# Patient Record
Sex: MALE | Race: BLACK OR AFRICAN AMERICAN | Employment: UNEMPLOYED | URBAN - METROPOLITAN AREA
[De-identification: names, ages, dates, MRNs, and addresses within clinical notes are randomized per-mention and may not be internally consistent; named-entity substitution may affect disease eponyms.]

---

## 2017-01-02 DIAGNOSIS — G40.909 SEIZURE DISORDER (H): Primary | ICD-10-CM

## 2017-01-02 RX ORDER — LEVETIRACETAM 500 MG/1
500 TABLET ORAL 2 TIMES DAILY
Qty: 60 TABLET | Refills: 5 | Status: SHIPPED | OUTPATIENT
Start: 2017-01-02 | End: 2017-07-26

## 2017-01-02 NOTE — TELEPHONE ENCOUNTER
Fax received 01/01/16    levETIRAcetam (KEPPRA) 500 MG      Last Written Prescription Date: 08/08/16  Last Fill Quantity: 60,  # refills: 5   Last Office Visit with [facility], [facility] or [facility] prescribing provider: 12/08/16

## 2017-02-17 ENCOUNTER — TELEPHONE (OUTPATIENT)
Dept: FAMILY MEDICINE | Facility: CLINIC | Age: 32
End: 2017-02-17

## 2017-02-17 ENCOUNTER — HOSPITAL ENCOUNTER (EMERGENCY)
Facility: CLINIC | Age: 32
Discharge: HOME | End: 2017-02-17
Admitting: EMERGENCY MEDICINE
Payer: COMMERCIAL

## 2017-02-17 VITALS
DIASTOLIC BLOOD PRESSURE: 92 MMHG | SYSTOLIC BLOOD PRESSURE: 126 MMHG | OXYGEN SATURATION: 99 % | TEMPERATURE: 98.9 F | RESPIRATION RATE: 18 BRPM | HEART RATE: 98 BPM

## 2017-02-17 LAB
FLUAV+FLUBV AG SPEC QL: NEGATIVE
FLUAV+FLUBV AG SPEC QL: NORMAL
FLUAV+FLUBV RNA SPEC QL NAA+PROBE: ABNORMAL
SPECIMEN SOURCE: ABNORMAL
SPECIMEN SOURCE: NORMAL

## 2017-02-17 PROCEDURE — 40000268 ZZH STATISTIC NO CHARGES

## 2017-02-17 PROCEDURE — 87804 INFLUENZA ASSAY W/OPTIC: CPT | Performed by: FAMILY MEDICINE

## 2017-02-17 NOTE — TELEPHONE ENCOUNTER
Incoming call from pt requesting a call back from RN. Per pt, he is not feeling well and would like to be seen. Writer offered to schedule an appt, but pt refused. He wants to be seen to be seen right away. Pt did not disclose further details to writer.    Melisa Fatima

## 2017-02-20 ENCOUNTER — OFFICE VISIT (OUTPATIENT)
Dept: FAMILY MEDICINE | Facility: CLINIC | Age: 32
End: 2017-02-20
Payer: COMMERCIAL

## 2017-02-20 VITALS
WEIGHT: 221 LBS | RESPIRATION RATE: 12 BRPM | OXYGEN SATURATION: 100 % | DIASTOLIC BLOOD PRESSURE: 90 MMHG | BODY MASS INDEX: 29.97 KG/M2 | TEMPERATURE: 98.4 F | SYSTOLIC BLOOD PRESSURE: 146 MMHG | HEART RATE: 111 BPM

## 2017-02-20 DIAGNOSIS — G40.909 SEIZURE DISORDER (H): Primary | ICD-10-CM

## 2017-02-20 DIAGNOSIS — B34.9 VIRAL SYNDROME: ICD-10-CM

## 2017-02-20 LAB
BASOPHILS # BLD AUTO: 0 10E9/L (ref 0–0.2)
BASOPHILS NFR BLD AUTO: 0.5 %
DEPRECATED S PYO AG THROAT QL EIA: NORMAL
DIFFERENTIAL METHOD BLD: ABNORMAL
EOSINOPHIL # BLD AUTO: 0.2 10E9/L (ref 0–0.7)
EOSINOPHIL NFR BLD AUTO: 4.3 %
ERYTHROCYTE [DISTWIDTH] IN BLOOD BY AUTOMATED COUNT: 13.2 % (ref 10–15)
HCT VFR BLD AUTO: 43.2 % (ref 40–53)
HETEROPH AB SER QL: NEGATIVE
HGB BLD-MCNC: 15.2 G/DL (ref 13.3–17.7)
LYMPHOCYTES # BLD AUTO: 2 10E9/L (ref 0.8–5.3)
LYMPHOCYTES NFR BLD AUTO: 47.1 %
MCH RBC QN AUTO: 31.4 PG (ref 26.5–33)
MCHC RBC AUTO-ENTMCNC: 35.2 G/DL (ref 31.5–36.5)
MCV RBC AUTO: 89 FL (ref 78–100)
MICRO REPORT STATUS: NORMAL
MONOCYTES # BLD AUTO: 0.7 10E9/L (ref 0–1.3)
MONOCYTES NFR BLD AUTO: 16.7 %
NEUTROPHILS # BLD AUTO: 1.3 10E9/L (ref 1.6–8.3)
NEUTROPHILS NFR BLD AUTO: 31.4 %
PLATELET # BLD AUTO: 281 10E9/L (ref 150–450)
RBC # BLD AUTO: 4.84 10E12/L (ref 4.4–5.9)
SPECIMEN SOURCE: NORMAL
WBC # BLD AUTO: 4.2 10E9/L (ref 4–11)

## 2017-02-20 PROCEDURE — 86308 HETEROPHILE ANTIBODY SCREEN: CPT | Performed by: FAMILY MEDICINE

## 2017-02-20 PROCEDURE — 87880 STREP A ASSAY W/OPTIC: CPT | Performed by: FAMILY MEDICINE

## 2017-02-20 PROCEDURE — 87798 DETECT AGENT NOS DNA AMP: CPT | Mod: 90 | Performed by: FAMILY MEDICINE

## 2017-02-20 PROCEDURE — 99000 SPECIMEN HANDLING OFFICE-LAB: CPT | Performed by: FAMILY MEDICINE

## 2017-02-20 PROCEDURE — 87081 CULTURE SCREEN ONLY: CPT | Performed by: FAMILY MEDICINE

## 2017-02-20 PROCEDURE — 99214 OFFICE O/P EST MOD 30 MIN: CPT | Performed by: FAMILY MEDICINE

## 2017-02-20 PROCEDURE — 85025 COMPLETE CBC W/AUTO DIFF WBC: CPT | Performed by: FAMILY MEDICINE

## 2017-02-20 PROCEDURE — 36415 COLL VENOUS BLD VENIPUNCTURE: CPT | Performed by: FAMILY MEDICINE

## 2017-02-20 NOTE — TELEPHONE ENCOUNTER
Pt was see in the ED Last Friday.      Will close encounter at this time and follow up as needed.     Nacho Snider RN

## 2017-02-20 NOTE — MR AVS SNAPSHOT
"              After Visit Summary   2017    Harry Gonzales    MRN: [id number]           Patient Information     Date Of Birth          1985        Visit Information        Provider Department      2017 1:30 PM Darling Camarillo MD [facility]        Today's Diagnoses     Seizure disorder (H)    -  1    Viral syndrome          Care Instructions    F/u with Rose Soriano in 2-4 weeks if not improving    Labs today            Follow-ups after your visit        Who to contact     If you have questions or need follow up information about today's clinic visit or your schedule please contact [facility] directly at [phone].  Normal or non-critical lab and imaging results will be communicated to you by MyChart, letter or phone within 4 business days after the clinic has received the results. If you do not hear from us within 7 days, please contact the clinic through MyChart or phone. If you have a critical or abnormal lab result, we will notify you by phone as soon as possible.  Submit refill requests through [employer] or call your pharmacy and they will forward the refill request to us. Please allow 3 business days for your refill to be completed.          Additional Information About Your Visit        MyChart Information     [employer] lets you send messages to your doctor, view your test results, renew your prescriptions, schedule appointments and more. To sign up, go to [email or web address] . Click on \"Log in\" on the left side of the screen, which will take you to the Welcome page. Then click on \"Sign up Now\" on the right side of the page.     You will be asked to enter the access code listed below, as well as some personal information. Please follow the directions to create your username and password.     Your access code is: [email or web address]  Expires: 2017  2:38 PM     Your access code will  in 90 days. If you need help or a new code, " please call your [facility] or [phone].        Care EveryWhere ID     This is your Care EveryWhere ID. This could be used by other organizations to access your [facility] medical records  [id number]        Your Vitals Were     Pulse Temperature Respirations Pulse Oximetry BMI (Body Mass Index)       111 98.4  F (36.9  C) (Oral) 12 100% 29.97 kg/m2        Blood Pressure from Last 3 Encounters:   02/20/17 146/90   02/17/17 (!) 126/92   12/08/16 125/90    Weight from Last 3 Encounters:   02/20/17 221 lb (100.2 kg)   12/08/16 216 lb (98 kg)   11/14/16 215 lb 8 oz (97.8 kg)              We Performed the Following     CBC with platelets and differential     Ehrlichia Anaplasma Sp by PCR     Mononucleosis screen     Strep, Rapid Screen     Zika Virus        Primary Care Provider Office Phone # Fax #    Rose Soriano APRMARYANN [phone]       [facility] [address] [place] [id number]        Thank you!     Thank you for choosing [facility]  for your care. Our goal is always to provide you with excellent care. Hearing back from our patients is one way we can continue to improve our services. Please take a few minutes to complete the written survey that you may receive in the mail after your visit with us. Thank you!             Your Updated Medication List - Protect others around you: Learn how to safely use, store and throw away your medicines at www.disposemymeds.org.          This list is accurate as of: 2/20/17  2:38 PM.  Always use your most recent med list.                   Brand Name Dispense Instructions for use    * hydrocortisone 1 % ointment     30 g    Apply sparingly to affected area two times daily for 7 days.       * hydrocortisone 1 % cream    CORTAID    30 g    Apply sparingly to affected area three times daily for 14 days.       ibuprofen 600 MG tablet    ADVIL/MOTRIN    20 tablet    Take 1 tablet (600 mg) by  mouth every 6 hours as needed for moderate pain       levETIRAcetam 500 MG tablet    KEPPRA    60 tablet    Take 1 tablet (500 mg) by mouth 2 times daily Needs to schedule with PCP prior to further refills.       loperamide 2 MG tablet    IMODIUM A-D    20 tablet    Take 1 tablet (2 mg) by mouth 4 times daily as needed for diarrhea       loratadine 10 MG tablet    CLARITIN    30 tablet    Take 1 tablet (10 mg) by mouth daily       OMEGA-3 FISH OIL PO      Take by mouth daily       ondansetron 4 MG tablet    ZOFRAN    18 tablet    Take 1 tablet (4 mg) by mouth every 8 hours as needed for nausea       psyllium 0.52 G capsule     100 capsule    Take 1 capsule (0.52 g) by mouth At Bedtime       terbinafine 250 MG tablet    lamISIL    14 tablet    Take 1 tablet (250 mg) by mouth daily       vitamin D 2000 UNITS tablet     100 tablet    Take 2,000 Units by mouth daily       * Notice:  This list has 2 medication(s) that are the same as other medications prescribed for you. Read the directions carefully, and ask your doctor or other care provider to review them with you.

## 2017-02-20 NOTE — NURSING NOTE
Chief Complaint   Patient presents with     RECHECK       Initial BP (!) 126/105 (BP Location: Left arm, Patient Position: Chair, Cuff Size: Adult Regular)  Pulse 111  Temp 98.4  F (36.9  C) (Oral)  Resp 12  Wt 221 lb (100.2 kg)  SpO2 100%  BMI 29.97 kg/m2 Estimated body mass index is 29.97 kg/(m^2) as calculated from the following:    Height as of 12/8/16: 6' (1.829 m).    Weight as of this encounter: 221 lb (100.2 kg).  Medication Reconciliation: complete     Gume Chan MA

## 2017-02-20 NOTE — PROGRESS NOTES
"SUBJECTIVE:                                                    Harry Gonzales is a 32 year old male who presents to clinic today for the following health issues:    Black male with glasses  Was in ER on the 17th but left. Influenza test was negative.    Chest congestion, headache lower neck, coughing, sneezing, feeling weak, body aches/pain, poor sleep, no energy, Had a fever last night.  Has been taking ibuprofen.    Here for \"cold\"  Onset Last Mon/Tues.  First symptom: HA, myalgia, coughing not much small yellow, upper neck hurting, fatigue, hard to focus and study  Fever? Felt feverish but no document temp at onset  Chills? yes  Nausea/Vomit/Diarrhea/Rash? Little nausea, little vomit none today. No diarrhea today. Rash no  What has he tried? Ibuprofen  Now lymph nodes bothering him  [employer] Feb 11 returned  Exposures: none known  Fluids ok   Solids ok. Today oatmeal, cheese stick  No known mosquito, tick or spider bites.      High Risk Drug Monitoring? Yes  Name of Drug being monitored: Keppra  Reason for drug, and what is being monitored and why: reduce frequency of seizures, BMP, neurological testing, BP  No seizure  BP Readings from Last 3 Encounters:   02/20/17 146/90   02/17/17 (!) 126/92   12/08/16 125/90     Estimated body mass index is 29.97 kg/(m^2) as calculated from the following:    Height as of 12/8/16: 6' (1.829 m).    Weight as of this encounter: 221 lb (100.2 kg).      Last Basic Metabolic Panel:  Lab Results   Component Value Date     11/08/2016      Lab Results   Component Value Date    POTASSIUM 3.8 11/08/2016     Lab Results   Component Value Date    CHLORIDE 107 11/08/2016     Lab Results   Component Value Date    AMBER 9.0 11/08/2016     Lab Results   Component Value Date    CO2 24 11/08/2016     Lab Results   Component Value Date    BUN 13 11/08/2016     Lab Results   Component Value Date    CR 0.76 11/08/2016     Lab Results   Component Value Date    GLC 81 11/08/2016     BP " Readings from Last 3 Encounters:   02/20/17 146/90   02/17/17 (!) 126/92   12/08/16 125/90     Keppra was low in Nov 2016  Follow-up Plan: Continue medication                Mental Health Followup    Status since last visit: Stable     See PHQ-9 for current symptoms.  Other associated symptoms: None    Complicating factors:   Significant life event:  No   Current substance abuse:  None  Anxiety or Panic symptoms:  No    MENTAL STATUS EXAM  Appearance: Appears stated age, dressed and groomed appropriately for the visit today.  Attitude: cooperative  Behavior: normal  Eye Contact: normal  Speech: normal  Orientation: oriented to person, place, time and situation  Mood:  admits sadness and anxiety most of time  Affect: Mood Congruent  Thought Process: clear  Suicidal Ideation: reports no thoughts, no intention  Hallucination: no  Paranoid-no  Manic-no  Panic-no  Self harm-cutting? no  OCD - no  Gambling - no  Excessive shopping no  Legal no  Self harm no    Sleep - limited due to illness  Appetite - decreased but reports drinking and eating  Exercise - limited due to illness and myalgia    Recent falls - no  Recent blackouts - no  Seizures - no    Smoking - no  Alcohol - no  Street drugs - no  Marijuana - no  Caffeine - occasional coffee/latte    Any ER/UC or hospital visits within the last 2 weeks? - see above left         PHQ-9  English PHQ-9   Any Language             Amount of exercise or physical activity: limited due to illness    Problems taking medications regularly: No    Medication side effects: none    Diet: regular (no restrictions)    Social History   Substance Use Topics     Smoking status: Never Smoker     Smokeless tobacco: Never Used     Alcohol use No        Problem list and histories reviewed & adjusted, as indicated.  Additional history: as documented    Patient Active Problem List   Diagnosis     Seizure disorder (H)     Health maintenance examination     CARDIOVASCULAR SCREENING; LDL GOAL LESS  THAN 160     Elevated blood pressure reading without diagnosis of hypertension     History of kidney stones     Seasonal allergic rhinitis due to fungal spores     No past surgical history on file.    Social History   Substance Use Topics     Smoking status: Never Smoker     Smokeless tobacco: Never Used     Alcohol use No     Family History   Problem Relation Age of Onset     Hypertension Father          Current Outpatient Prescriptions   Medication Sig Dispense Refill     levETIRAcetam (KEPPRA) 500 MG tablet Take 1 tablet (500 mg) by mouth 2 times daily Needs to schedule with PCP prior to further refills. 60 tablet 5     loratadine (CLARITIN) 10 MG tablet Take 1 tablet (10 mg) by mouth daily 30 tablet 11     psyllium 0.52 G capsule Take 1 capsule (0.52 g) by mouth At Bedtime 100 capsule 1     hydrocortisone (CORTAID) 1 % cream Apply sparingly to affected area three times daily for 14 days. 30 g 0     Omega-3 Fatty Acids (OMEGA-3 FISH OIL PO) Take by mouth daily       ondansetron (ZOFRAN) 4 MG tablet Take 1 tablet (4 mg) by mouth every 8 hours as needed for nausea 18 tablet 1     loperamide (IMODIUM A-D) 2 MG tablet Take 1 tablet (2 mg) by mouth 4 times daily as needed for diarrhea 20 tablet 0     hydrocortisone 1 % ointment Apply sparingly to affected area two times daily for 7 days. 30 g 0     ibuprofen (ADVIL,MOTRIN) 600 MG tablet Take 1 tablet (600 mg) by mouth every 6 hours as needed for moderate pain 20 tablet 0     terbinafine (LAMISIL) 250 MG tablet Take 1 tablet (250 mg) by mouth daily 14 tablet 0     Cholecalciferol (VITAMIN D) 2000 UNITS tablet Take 2,000 Units by mouth daily 100 tablet 3     Allergies   Allergen Reactions     Nkda [No Known Drug Allergies]      Seasonal Allergies      Recent Labs   Lab Test  11/08/16   1639  10/03/16   2210  01/22/15   1417  05/21/14   1650  11/04/13   1656   LDL  134*   --   145*   --    --    HDL   --    --   60   --    --    TRIG   --    --   112   --    --    ALT   35   --    --    --   33   CR  0.76  0.77   --    --   0.73   GFRESTIMATED  >90  Non  GFR Calc    >90  Non  GFR Calc     --    --   >90   GFRESTBLACK  >90   GFR Calc    >90   GFR Calc     --    --   >90   POTASSIUM  3.8  3.7   --    --   4.6   TSH   --    --    --   0.71   --       BP Readings from Last 3 Encounters:   02/20/17 146/90   02/17/17 (!) 126/92   12/08/16 125/90    Wt Readings from Last 3 Encounters:   02/20/17 221 lb (100.2 kg)   12/08/16 216 lb (98 kg)   11/14/16 215 lb 8 oz (97.8 kg)        Labs reviewed in EPIC  Problem list, Medication list, Allergies, and Medical/Social/Surgical histories reviewed in [facility] and updated as appropriate.     ROS: 10 point ROS neg other than the symptoms noted above in the HPI.       OBJECTIVE:                                                    /90  Pulse 111  Temp 98.4  F (36.9  C) (Oral)  Resp 12  Wt 221 lb (100.2 kg)  SpO2 100%  BMI 29.97 kg/m2  Body mass index is 29.97 kg/(m^2).  GENERAL: black male with glasses, healthy, alert, well nourished, well hydrated, no distress  EYES: Eyes grossly normal to inspection, extraocular movements - intact, and PERRL  HENT: ear canals- normal; TMs- normal; Nose- normal; Mouth- no ulcers, minimal phlegm and ?white tonsillar lesions  NECK: no tenderness, positive adenopathy, no asymmetry, no masses, no stiffness; thyroid- normal to palpation  RESP: lungs clear to auscultation - no rales, no rhonchi, no wheezes  CV: regular rates and rhythm, normal S1 S2, no S3 or S4 and no murmur, no click or rub - no homans or cords  ABDOMEN: soft, no tenderness, no  hepatosplenomegaly, no masses, normal bowel sounds  MS: extremities- no gross deformities noted, no edema  SKIN: no suspicious lesions, no rashes  NEURO: strength and tone- normal, sensory exam- grossly normal, mentation- intact, speech- normal, reflexes- symmetric  Non focal no aphasia. No facial asymmetry. Finger  to nose, rapid alteration, finger thumb opposition, heel knee shin are normal.  BACK: no CVA tenderness, no paralumbar tenderness  PSYCH: Alert and oriented times 3; speech- coherent , normal rate and volume; able to articulate logical thoughts, able to abstract reason, no tangential thoughts, no hallucinations or delusions, affect- normal   LYMPHATICS: ant. cervical- normal, post. cervical- normal, axillary- normal, supraclavicular- normal, inguinal- normal     Quick strep and mono were negative  Lab Results   Component Value Date    WBC 4.2 02/20/2017     Lab Results   Component Value Date    RBC 4.84 02/20/2017     Lab Results   Component Value Date    HGB 15.2 02/20/2017     Lab Results   Component Value Date    HCT 43.2 02/20/2017     Lab Results   Component Value Date    MCV 89 02/20/2017     Lab Results   Component Value Date    MCH 31.4 02/20/2017     Lab Results   Component Value Date    MCHC 35.2 02/20/2017     Lab Results   Component Value Date    RDW 13.2 02/20/2017     Lab Results   Component Value Date     02/20/2017       ASSESSMENT/PLAN:                                                      ASSESSMENT / PLAN:  (G40.909) Seizure disorder (H)  (primary encounter diagnosis)  Comment: reviewed that last Keppra level was low. No seizures reported  Plan: continue care    (B34.9) Viral syndrome  Comment: Was in [place], no known mosquito bite  Plan: Strep, Rapid Screen, Mononucleosis screen, CBC         with platelets and differential, Ehrlichia         Anaplasma Sp by PCR, Zika Virus                Patient Instructions:  F/u with Rose Thron in 2-4 weeks if not improving    Labs today        Darling Camarillo MD  [facility]  30 min spent in direct face to face time with this pt discussing Likely viral illness r/o strep/mono and others and others described above, greater than 50% in counseling and coordination of care.

## 2017-02-21 LAB
LOCATION PERFORMED: NORMAL
NORMAL RANGE FOR SEND OUTS MISC TEST: NORMAL
RESULT: NORMAL
SEND OUTS MISC TEST CODE: NORMAL
SEND OUTS MISC TEST SPECIMEN: NORMAL
TEST NAME: NORMAL

## 2017-02-22 LAB
BACTERIA SPEC CULT: NORMAL
MICRO REPORT STATUS: NORMAL
SPECIMEN SOURCE: NORMAL

## 2017-02-24 LAB
A PHAGOCYTOPH DNA BLD QL NAA+PROBE: NOT DETECTED
E CHAFFEENSIS DNA BLD QL NAA+PROBE: NOT DETECTED
E EWINGII DNA SPEC QL NAA+PROBE: NOT DETECTED
EHRLICHIA DNA SPEC QL NAA+PROBE: NORMAL

## 2017-06-09 ENCOUNTER — APPOINTMENT (OUTPATIENT)
Dept: GENERAL RADIOLOGY | Facility: CLINIC | Age: 32
End: 2017-06-09
Attending: EMERGENCY MEDICINE
Payer: COMMERCIAL

## 2017-06-09 ENCOUNTER — HOSPITAL ENCOUNTER (EMERGENCY)
Facility: CLINIC | Age: 32
Discharge: HOME | End: 2017-06-09
Attending: EMERGENCY MEDICINE | Admitting: EMERGENCY MEDICINE
Payer: COMMERCIAL

## 2017-06-09 VITALS
OXYGEN SATURATION: 97 % | RESPIRATION RATE: 16 BRPM | BODY MASS INDEX: 29.81 KG/M2 | WEIGHT: 219.8 LBS | HEART RATE: 69 BPM | TEMPERATURE: 98.3 F | DIASTOLIC BLOOD PRESSURE: 77 MMHG | SYSTOLIC BLOOD PRESSURE: 113 MMHG

## 2017-06-09 DIAGNOSIS — S39.012A BACK STRAIN, INITIAL ENCOUNTER: ICD-10-CM

## 2017-06-09 DIAGNOSIS — S20.221A BACK CONTUSION, RIGHT, INITIAL ENCOUNTER: ICD-10-CM
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
## 2017-06-09 PROCEDURE — 96372 THER/PROPH/DIAG INJ SC/IM: CPT

## 2017-06-09 PROCEDURE — 99284 EMERGENCY DEPT VISIT MOD MDM: CPT | Mod: 25

## 2017-06-09 PROCEDURE — 25000128 H RX IP 250 OP 636: Performed by: EMERGENCY MEDICINE

## 2017-06-09 PROCEDURE — 72100 X-RAY EXAM L-S SPINE 2/3 VWS: CPT

## 2017-06-09 PROCEDURE — 99284 EMERGENCY DEPT VISIT MOD MDM: CPT | Mod: Z6 | Performed by: EMERGENCY MEDICINE

## 2017-06-09 RX ORDER — CYCLOBENZAPRINE HCL 5 MG
10 TABLET ORAL 2 TIMES DAILY PRN
Qty: 10 TABLET | Refills: 0 | Status: SHIPPED | OUTPATIENT
Start: 2017-06-09 | End: 2017-07-03

## 2017-06-09 RX ORDER — KETOROLAC TROMETHAMINE 30 MG/ML
30 INJECTION, SOLUTION INTRAMUSCULAR; INTRAVENOUS ONCE
Status: COMPLETED | OUTPATIENT
Start: 2017-06-09 | End: 2017-06-09

## 2017-06-09 RX ORDER — NAPROXEN 500 MG/1
500 TABLET ORAL 2 TIMES DAILY WITH MEALS
Qty: 16 TABLET | Refills: 0 | Status: SHIPPED | OUTPATIENT
Start: 2017-06-09 | End: 2017-06-17

## 2017-06-09 RX ADMIN — KETOROLAC TROMETHAMINE 30 MG: 30 INJECTION, SOLUTION INTRAMUSCULAR at 15:24

## 2017-06-09 ASSESSMENT — ENCOUNTER SYMPTOMS
NECK PAIN: 0
POLYDIPSIA: 0
ABDOMINAL PAIN: 0
WEAKNESS: 0
VOMITING: 0
AGITATION: 0
BRUISES/BLEEDS EASILY: 0
FLANK PAIN: 0
CHILLS: 0
LIGHT-HEADEDNESS: 0
SHORTNESS OF BREATH: 0
DIFFICULTY URINATING: 0
NAUSEA: 0
FEVER: 0
NUMBNESS: 0
HEMATURIA: 0
COLOR CHANGE: 0
ADENOPATHY: 0
BACK PAIN: 1

## 2017-06-09 NOTE — ED AVS SNAPSHOT
[facility], Emergency Department    [address]    [facility] [place] [address]    Phone:  [phone]    Fax:  [phone]                                       Harry Gonzales   MRN: [id number]    Department:  [facility], Emergency Department   Date of Visit:  6/9/2017           Patient Information     Date Of Birth          1985        Your diagnoses for this visit were:     Back strain, initial encounter     Back contusion, right, initial encounter        You were seen by Crystal Garcia MD.        Discharge Instructions       Please make an appointment to follow up with [facility] (phone: [phone]) in 3 days if you have any concerns.    Back Sprain or Strain    Injury to the muscles (strain) or ligaments (sprain) around the spine can be troubling. Injury may occur after a sudden forceful twisting or bending force such as in a car accident, after a simple awkward movement, or after lifting something heavy with poor body positioning. In any case, muscle spasm is often present and adds to the pain.  Thankfully, most people feel better in 1 to 2 weeks, and most of the rest in 1 to 2 months. Most people can remain active. Unless you had a forceful physical injury such as a car accident or fall, X-rays are usually not ordered for the first evaluation of a back sprain or strain. If pain continues and does not respond to medical treatment, your healthcare provider may order X-rays and other tests.  Home care  The following guidelines will help you care for your injury at home:    When in bed, try to find a comfortable position. A firm mattress is best. Try lying flat on your back with pillows under your knees. You can also try lying on your side with your knees bent up toward your chest and a pillow between your knees.    Don't sit for long periods. Try not to take long car rides or take other trips that have you sitting for a long time. This puts more stress on the lower back than  standing or walking.    During the first 24 to 72 hours after an injury or flare-up, apply an ice pack to the painful area for 20 minutes. Then remove it for 20 minutes. Do this for 60 to 90 minutes, or several times a day, This will reduce swelling and pain. Be sure to wrap the ice pack in a thin towel or plastic to protect your skin.    You can start with ice, then switch to heat. Heat from a hot shower, hot bath, or heating pad reduces swelling and pain and works well for muscle spasms. Put heat on the painful area for 20 minutes, then remove for 20 minutes. Do this for 60 to 90 minutes, or several times a day. Do not use a heating pad while sleeping. It can burn the skin.    You can alternate the ice and heat. Talk with your healthcare provider to find out the best treatment or therapy for your back pain.    Therapeutic massage will help relax the back muscles without stretching them.    Be aware of safe lifting methods. Do not lift anything over 15 pounds until all of the pain is gone.  Medicines  Talk to your healthcare provider before using medicines, especially if you have other health problems or are taking other medicines.    You may use acetaminophen or ibuprofen to control pain, unless another pain medicine was prescribed. If you have chronic conditions like diabetes, liver or kidney disease, stomach ulcers, or gastrointestinal bleeding, or are taking blood-thinner medicines, talk with your doctor before taking any medicines.    Be careful if you are given prescription medicines, narcotics, or medicine for muscle spasm. They can cause drowsiness, and affect your coordination, reflexes, and judgment. Do not drive or operate heavy machinery.  Follow-up care  Follow up with your healthcare provider or this facility as advised. You may need physical therapy or more tests if your symptoms get worse.  If you had X-rays today, they didn t show any broken bones, breaks, or fractures. Sometimes fractures don t  show up on the first X-ray. Bruises and sprains can sometimes hurt as much as a fracture. These injuries can take time to heal completely. If your symptoms don t improve or they get worse, talk with your healthcare provider. You may need a repeat X-ray.  Call 911  Call for emergency care if any of the following occur:    Trouble breathing    Confused    Very drowsy or trouble awakening    Fainting or loss of consciousness    Rapid or very slow heart rate    Loss of bowel or bladder control  When to seek medical advice  Call your healthcare provider right away if any of the following occur:    Pain gets worse or spreads to your arms or legs    Weakness or numbness in one or both arms or legs    Numbness in the groin or genital area    7804-5211 [employer]. [address] [place] [address], [place] [address]. All rights reserved. This information is not intended as a substitute for professional medical care. Always follow your healthcare professional's instructions.          Back Contusion    You have a contusion to your back. A contusion is also called a bruise. There is swelling and some bleeding under the skin. The skin may be purplish. You may have muscle aching and stiffness in the area of the bruise. There are no broken bones.  Contusions heal on their own, without further treatment. However, pain and skin discoloration may take weeks to months to go away.   Home care    Rest. Avoid heavy lifting, strenuous exertion, or any activity that causes pain.    Ice the area to reduce pain and swelling. Put ice cubes in a plastic bag or use a cold pack. (Wrap the cold source in a thin towel. Do not place it directly on your skin.) Ice the injured area for 20 minutes every 1-2 hours the first day. Continue with ice packs 3-4 times a day for the next two days, then as needed for the relief of pain and swelling.    Take any prescribed pain medication. If none was prescribed, take acetaminophen, ibuprofen, or  naproxen to control pain.  Follow-up care  Follow up with your healthcare provider, or as directed. Call if you are not better in 1-2 weeks.  When to seek medical advice  Call your healthcare provider for any of the following:    New or worsening pain    Increased swelling around the bruise    Pain spreads to one or both legs    Weakness or numbness in one or both legs     Loss of bowel or bladder control    Numbness in the groin or genital area    Fever of 100.4 F (38 C) or higher, or as directed by your healthcare provider    7947-2017 [employer]. [address] [place] [address]. All rights reserved. This information is not intended as a substitute for professional medical care. Always follow your healthcare professional's instructions.            24 Hour Appointment Hotline       To make an appointment at any [facility], call [phone] ([phone]). If you don't have a family doctor or clinic, we will help you find one. [facility] are conveniently located to serve the needs of you and your family.             Review of your medicines      START taking        Dose / Directions Last dose taken    cyclobenzaprine 5 MG tablet   Commonly known as:  FLEXERIL   Dose:  10 mg   Quantity:  10 tablet        Take 2 tablets (10 mg) by mouth 2 times daily as needed for muscle spasms   Refills:  0        naproxen 500 MG tablet   Commonly known as:  NAPROSYN   Dose:  500 mg   Quantity:  16 tablet        Take 1 tablet (500 mg) by mouth 2 times daily (with meals) for 8 days   Refills:  0          Our records show that you are taking the medicines listed below. If these are incorrect, please call your family doctor or clinic.        Dose / Directions Last dose taken    * hydrocortisone 1 % ointment   Quantity:  30 g        Apply sparingly to affected area two times daily for 7 days.   Refills:  0        * hydrocortisone 1 % cream   Commonly known as:  CORTAID   Quantity:  30 g         Apply sparingly to affected area three times daily for 14 days.   Refills:  0        ibuprofen 600 MG tablet   Commonly known as:  ADVIL/MOTRIN   Dose:  600 mg   Quantity:  20 tablet        Take 1 tablet (600 mg) by mouth every 6 hours as needed for moderate pain   Refills:  0        levETIRAcetam 500 MG tablet   Commonly known as:  KEPPRA   Dose:  500 mg   Quantity:  60 tablet        Take 1 tablet (500 mg) by mouth 2 times daily Needs to schedule with PCP prior to further refills.   Refills:  5        loperamide 2 MG tablet   Commonly known as:  IMODIUM A-D   Dose:  2 mg   Quantity:  20 tablet        Take 1 tablet (2 mg) by mouth 4 times daily as needed for diarrhea   Refills:  0        loratadine 10 MG tablet   Commonly known as:  CLARITIN   Dose:  10 mg   Quantity:  30 tablet        Take 1 tablet (10 mg) by mouth daily   Refills:  11        OMEGA-3 FISH OIL PO        Take by mouth daily   Refills:  0        ondansetron 4 MG tablet   Commonly known as:  ZOFRAN   Dose:  4 mg   Quantity:  18 tablet        Take 1 tablet (4 mg) by mouth every 8 hours as needed for nausea   Refills:  1        psyllium 0.52 G capsule   Dose:  1 capsule   Quantity:  100 capsule        Take 1 capsule (0.52 g) by mouth At Bedtime   Refills:  1        terbinafine 250 MG tablet   Commonly known as:  lamISIL   Dose:  250 mg   Quantity:  14 tablet        Take 1 tablet (250 mg) by mouth daily   Refills:  0        vitamin D 2000 UNITS tablet   Dose:  2000 Units   Quantity:  100 tablet        Take 2,000 Units by mouth daily   Refills:  3        * Notice:  This list has 2 medication(s) that are the same as other medications prescribed for you. Read the directions carefully, and ask your doctor or other care provider to review them with you.            Prescriptions were sent or printed at these locations (2 Prescriptions)                   Other Prescriptions                Printed at Department/Unit printer (2 of 2)         naproxen (NAPROSYN)  "500 MG tablet               cyclobenzaprine (FLEXERIL) 5 MG tablet                Procedures and tests performed during your visit     Lumbar spine XR, 2-3 views      Orders Needing Specimen Collection     None      Pending Results     No orders found from 2017 to 6/10/2017.            Pending Culture Results     No orders found from 2017 to 6/10/2017.            Pending Results Instructions     If you had any lab results that were not finalized at the time of your Discharge, you can call the ED Lab Result RN at [phone]. You will be contacted by this team for any positive Lab results or changes in treatment. The nurses are available 7 days a week from 10A to 6:30P.  You can leave a message 24 hours per day and they will return your call.        Thank you for choosing [facility]       Thank you for choosing [facility] for your care. Our goal is always to provide you with excellent care. Hearing back from our patients is one way we can continue to improve our services. Please take a few minutes to complete the written survey that you may receive in the mail after you visit with us. Thank you!        [employer] Information     [employer] lets you send messages to your doctor, view your test results, renew your prescriptions, schedule appointments and more. To sign up, go to [email or web address] . Click on \"Log in\" on the left side of the screen, which will take you to the Welcome page. Then click on \"Sign up Now\" on the right side of the page.     You will be asked to enter the access code listed below, as well as some personal information. Please follow the directions to create your username and password.     Your access code is: [email or web address]  Expires: 2017  3:38 PM     Your access code will  in 90 days. If you need help or a new code, please call your [facility] clinic or [phone].        Care EveryWhere ID     This is your Care EveryWhere ID. This could be used by other organizations to access your " [facility] medical records  [id number]        After Visit Summary       This is your record. Keep this with you and show to your community pharmacist(s) and doctor(s) at your next visit.

## 2017-06-09 NOTE — ED AVS SNAPSHOT
[facility] [place], Emergency Department    [address]    [facility] [place] [address]    Phone:  [phone]    Fax:  [phone]                                       Harry Gonzales   MRN: [id number]    Department:  [facility], Emergency Department   Date of Visit:  6/9/2017           After Visit Summary Signature Page     I have received my discharge instructions, and my questions have been answered. I have discussed any challenges I see with this plan with the nurse or doctor.    ..........................................................................................................................................  Patient/Patient Representative Signature      ..........................................................................................................................................  Patient Representative Print Name and Relationship to Patient    ..................................................               ................................................  Date                                            Time    ..........................................................................................................................................  Reviewed by Signature/Title    ...................................................              ..............................................  Date                                                            Time

## 2017-06-09 NOTE — DISCHARGE INSTRUCTIONS
Please make an appointment to follow up with [facility] (phone: [phone]) in 3 days if you have any concerns.    Back Sprain or Strain    Injury to the muscles (strain) or ligaments (sprain) around the spine can be troubling. Injury may occur after a sudden forceful twisting or bending force such as in a car accident, after a simple awkward movement, or after lifting something heavy with poor body positioning. In any case, muscle spasm is often present and adds to the pain.  Thankfully, most people feel better in 1 to 2 weeks, and most of the rest in 1 to 2 months. Most people can remain active. Unless you had a forceful physical injury such as a car accident or fall, X-rays are usually not ordered for the first evaluation of a back sprain or strain. If pain continues and does not respond to medical treatment, your healthcare provider may order X-rays and other tests.  Home care  The following guidelines will help you care for your injury at home:    When in bed, try to find a comfortable position. A firm mattress is best. Try lying flat on your back with pillows under your knees. You can also try lying on your side with your knees bent up toward your chest and a pillow between your knees.    Don't sit for long periods. Try not to take long car rides or take other trips that have you sitting for a long time. This puts more stress on the lower back than standing or walking.    During the first 24 to 72 hours after an injury or flare-up, apply an ice pack to the painful area for 20 minutes. Then remove it for 20 minutes. Do this for 60 to 90 minutes, or several times a day, This will reduce swelling and pain. Be sure to wrap the ice pack in a thin towel or plastic to protect your skin.    You can start with ice, then switch to heat. Heat from a hot shower, hot bath, or heating pad reduces swelling and pain and works well for muscle spasms. Put heat on the painful area for 20 minutes, then  remove for 20 minutes. Do this for 60 to 90 minutes, or several times a day. Do not use a heating pad while sleeping. It can burn the skin.    You can alternate the ice and heat. Talk with your healthcare provider to find out the best treatment or therapy for your back pain.    Therapeutic massage will help relax the back muscles without stretching them.    Be aware of safe lifting methods. Do not lift anything over 15 pounds until all of the pain is gone.  Medicines  Talk to your healthcare provider before using medicines, especially if you have other health problems or are taking other medicines.    You may use acetaminophen or ibuprofen to control pain, unless another pain medicine was prescribed. If you have chronic conditions like diabetes, liver or kidney disease, stomach ulcers, or gastrointestinal bleeding, or are taking blood-thinner medicines, talk with your doctor before taking any medicines.    Be careful if you are given prescription medicines, narcotics, or medicine for muscle spasm. They can cause drowsiness, and affect your coordination, reflexes, and judgment. Do not drive or operate heavy machinery.  Follow-up care  Follow up with your healthcare provider or this facility as advised. You may need physical therapy or more tests if your symptoms get worse.  If you had X-rays today, they didn t show any broken bones, breaks, or fractures. Sometimes fractures don t show up on the first X-ray. Bruises and sprains can sometimes hurt as much as a fracture. These injuries can take time to heal completely. If your symptoms don t improve or they get worse, talk with your healthcare provider. You may need a repeat X-ray.  Call 911  Call for emergency care if any of the following occur:    Trouble breathing    Confused    Very drowsy or trouble awakening    Fainting or loss of consciousness    Rapid or very slow heart rate    Loss of bowel or bladder control  When to seek medical advice  Call your healthcare  provider right away if any of the following occur:    Pain gets worse or spreads to your arms or legs    Weakness or numbness in one or both arms or legs    Numbness in the groin or genital area    9051-1600 [employer]. [address] [place] [address], [place] [address]. All rights reserved. This information is not intended as a substitute for professional medical care. Always follow your healthcare professional's instructions.          Back Contusion    You have a contusion to your back. A contusion is also called a bruise. There is swelling and some bleeding under the skin. The skin may be purplish. You may have muscle aching and stiffness in the area of the bruise. There are no broken bones.  Contusions heal on their own, without further treatment. However, pain and skin discoloration may take weeks to months to go away.   Home care    Rest. Avoid heavy lifting, strenuous exertion, or any activity that causes pain.    Ice the area to reduce pain and swelling. Put ice cubes in a plastic bag or use a cold pack. (Wrap the cold source in a thin towel. Do not place it directly on your skin.) Ice the injured area for 20 minutes every 1-2 hours the first day. Continue with ice packs 3-4 times a day for the next two days, then as needed for the relief of pain and swelling.    Take any prescribed pain medication. If none was prescribed, take acetaminophen, ibuprofen, or naproxen to control pain.  Follow-up care  Follow up with your healthcare provider, or as directed. Call if you are not better in 1-2 weeks.  When to seek medical advice  Call your healthcare provider for any of the following:    New or worsening pain    Increased swelling around the bruise    Pain spreads to one or both legs    Weakness or numbness in one or both legs     Loss of bowel or bladder control    Numbness in the groin or genital area    Fever of 100.4 F (38 C) or higher, or as directed by your healthcare provider    7295-6788 [employer]. [address] [place] [address], [place] [address]. All rights reserved. This information is not intended as a substitute for professional medical care. Always follow your healthcare professional's instructions.

## 2017-06-09 NOTE — ED PROVIDER NOTES
History     Chief Complaint   Patient presents with     Back Pain     pt slipped on water and fell backwards around 1230, having left sided lower back pain     HPI  Harry Gonzales is a 32 year old male with a history of HTN and seizures who presents to the Emergency Department for evaluation after a fall. About 2 hours ago, the patient slipped in the bathroom, falling backwards onto his back and hitting the back of his head on the floor. He did not lose consciousness. Immediately, he developed right, lower, sharp, non-radiating, constant, mild-moderate back pain. He was able to ambulate after the fall without difficulty.  Movement exacerbates his pain and has not taken anything for his discomfort secondary to fasting. Rest improves the symptoms. He denies headache, nausea, vomiting, weakness, numbness, tingling, urinary symptoms, loss of bowel or bladder control, abdominal pain, fever, or any other concerns or complaints at this time.    Past Medical History:   Diagnosis Date     Allergic state      Seizures (H)        History reviewed. No pertinent surgical history.    Family History   Problem Relation Age of Onset     Hypertension Father        Social History   Substance Use Topics     Smoking status: Never Smoker     Smokeless tobacco: Never Used     Alcohol use No       No current facility-administered medications for this encounter.      Current Outpatient Prescriptions   Medication     naproxen (NAPROSYN) 500 MG tablet     cyclobenzaprine (FLEXERIL) 5 MG tablet     levETIRAcetam (KEPPRA) 500 MG tablet     ibuprofen (ADVIL,MOTRIN) 600 MG tablet     Cholecalciferol (VITAMIN D) 2000 UNITS tablet     loratadine (CLARITIN) 10 MG tablet     psyllium 0.52 G capsule     hydrocortisone (CORTAID) 1 % cream     Omega-3 Fatty Acids (OMEGA-3 FISH OIL PO)     ondansetron (ZOFRAN) 4 MG tablet     loperamide (IMODIUM A-D) 2 MG tablet     hydrocortisone 1 % ointment     terbinafine (LAMISIL) 250 MG tablet        Allergies    Allergen Reactions     Nkda [No Known Drug Allergies]      Seasonal Allergies      I have reviewed the Medications, Allergies, Past Medical and Surgical History, and Social History in the Epic system.    Review of Systems   Constitutional: Negative for chills and fever.   HENT: Negative for congestion.    Eyes: Negative for visual disturbance.   Respiratory: Negative for shortness of breath.    Cardiovascular: Negative for chest pain.   Gastrointestinal: Negative for abdominal pain, nausea and vomiting.   Endocrine: Negative for polydipsia and polyuria.   Genitourinary: Negative for difficulty urinating, flank pain and hematuria.   Musculoskeletal: Positive for back pain. Negative for gait problem and neck pain.   Skin: Negative for color change.   Neurological: Negative for weakness, light-headedness and numbness.   Hematological: Negative for adenopathy. Does not bruise/bleed easily.   Psychiatric/Behavioral: Negative for agitation and behavioral problems.       Physical Exam   BP: 116/78  Pulse: 84  Temp: 97.7  F (36.5  C)  Resp: 16  Weight: 99.7 kg (219 lb 12.8 oz)  SpO2: 97 %  Physical Exam   Constitutional: He is oriented to person, place, and time. He appears well-developed and well-nourished. No distress.   HENT:   Head: Normocephalic and atraumatic.   Right Ear: External ear normal.   Left Ear: External ear normal.   Nose: Nose normal.   Mouth/Throat: Oropharynx is clear and moist. No oropharyngeal exudate.   Eyes: Conjunctivae and EOM are normal. Pupils are equal, round, and reactive to light. No scleral icterus.   Neck: Normal range of motion, full passive range of motion without pain and phonation normal. Neck supple. No spinous process tenderness and no muscular tenderness present. No rigidity. No edema, no erythema and normal range of motion present.   Cardiovascular: Normal rate, normal heart sounds and intact distal pulses.    Pulmonary/Chest: Effort normal and breath sounds normal. No respiratory  distress. He has no wheezes. He has no rales.   Abdominal: Soft. Bowel sounds are normal. There is no tenderness.   Musculoskeletal: Normal range of motion. He exhibits tenderness ( in right lower back musculature). He exhibits no edema.   There is no midline cervical, thoracic, or lumbar spinous process tenderness to palpation.  Pelvis is stable. No tenderness over hips.   Neurological: He is alert and oriented to person, place, and time. He has normal strength and normal reflexes. He displays normal reflexes. No cranial nerve deficit or sensory deficit. He exhibits normal muscle tone. Coordination and gait normal. GCS eye subscore is 4. GCS verbal subscore is 5. GCS motor subscore is 6.   Reflex Scores:       Patellar reflexes are 2+ on the right side and 2+ on the left side.       Achilles reflexes are 2+ on the right side and 2+ on the left side.  Strength 5/5 in b/l UEs with  and b/l LEs with dorsi- and plantar-flexion against resistance. Sensation to light touch and 2-point discrimination intact in b/l distal UEs and LEs. No saddle anesthesia. Normal gait. 2+ pattellar and Achilles reflexes b/l.     Skin: Skin is warm. No rash noted. He is not diaphoretic.   Psychiatric: He has a normal mood and affect. His behavior is normal. Judgment and thought content normal.   Nursing note and vitals reviewed.      ED Course     ED Course     Procedures             Critical Care time:  none               Labs Ordered and Resulted from Time of ED Arrival Up to the Time of Departure from the ED - No data to display         Assessments & Plan (with Medical Decision Making)   32-year-old man presenting with the right-sided back pain after fall that occurred last night. Differential diagnosis: Fracture, contusion, strain, unlikely cauda equina syndrome.    After thorough history and physical exam patient presents in no acute distress. I will treatr his pain with IM Toradol and obtain and x-ray for further diagnostic  evaluation. He agrees with plan.    I reviewed the patient s x-ray and I read the radiology report; there is no evidence of acute fracture. He has no bony tenderness on the examination whatsoever and has a normal neurologic deficits. I have extremely low suspicion for cauda equina syndrome. At this point patient stable for discharge with prescriptions for Naproxen and Flexeril. He agrees to follow-up with his primary care clinic if he has any further concerns and return to Emergency Department if his symptoms worsen. Stable for discharge. Gait is normal at discharge.    I have reviewed the nursing notes.  I have reviewed the findings, diagnosis, plan and need for follow up with the patient.  New Prescriptions    CYCLOBENZAPRINE (FLEXERIL) 5 MG TABLET    Take 2 tablets (10 mg) by mouth 2 times daily as needed for muscle spasms    NAPROXEN (NAPROSYN) 500 MG TABLET    Take 1 tablet (500 mg) by mouth 2 times daily (with meals) for 8 days       Final diagnoses:   Back strain, initial encounter   Back contusion, right, initial encounter     IMelissa, am serving as a trained medical scribe to document services personally performed by Crystal Garcia MD, based on the provider's statements to me.      ICrystal MD, was physically present and have reviewed and verified the accuracy of this note documented by Melissa Hills.     6/9/2017   [facility], [place], EMERGENCY DEPARTMENT     Crystal Garcia MD  06/09/17 1573

## 2017-07-03 ENCOUNTER — TELEPHONE (OUTPATIENT)
Dept: FAMILY MEDICINE | Facility: CLINIC | Age: 32
End: 2017-07-03

## 2017-07-03 DIAGNOSIS — M54.50 ACUTE BILATERAL LOW BACK PAIN WITHOUT SCIATICA: Primary | ICD-10-CM

## 2017-07-03 RX ORDER — CYCLOBENZAPRINE HCL 5 MG
10 TABLET ORAL 2 TIMES DAILY PRN
Qty: 40 TABLET | Refills: 3 | Status: SHIPPED | OUTPATIENT
Start: 2017-07-03 | End: 2017-07-16

## 2017-07-03 NOTE — TELEPHONE ENCOUNTER
Patient calling about back pain that patient has had since fall prior to ED visit, patient is requesting refill of flexeril and appt with Rose. Patient scheduled with Rose Wednesday    Flexeril      Last Written Prescription Date: 6/9/17  Last Fill Quantity: 10,  # refills: 0   Last Office Visit with [facility], P or [facility] prescribing provider: 7/5/17                                         Next 5 appointments (look out 90 days)     Jul 05, 2017  9:30 AM CDT   Return Visit with VICTORIANO Edwards CNP   [facility] ([facility])    [address] [place] [address]  [address]  [facility] [address]   [phone]

## 2017-07-03 NOTE — TELEPHONE ENCOUNTER
Reason for Call:  Other call back    Detailed comments: Pt states he fell, and is not feeling well.     Phone Number Patient can be reached at: [phone]    Best Time: Anytime    Can we leave a detailed message on this number? YES    Call taken on 7/3/2017 at 1:03 PM by Melisa Fatima

## 2017-07-16 ENCOUNTER — HOSPITAL ENCOUNTER (EMERGENCY)
Facility: CLINIC | Age: 32
Discharge: HOME | End: 2017-07-16
Attending: FAMILY MEDICINE | Admitting: FAMILY MEDICINE
Payer: COMMERCIAL

## 2017-07-16 ENCOUNTER — APPOINTMENT (OUTPATIENT)
Dept: CT IMAGING | Facility: CLINIC | Age: 32
End: 2017-07-16
Attending: FAMILY MEDICINE
Payer: COMMERCIAL

## 2017-07-16 VITALS
WEIGHT: 217 LBS | DIASTOLIC BLOOD PRESSURE: 94 MMHG | SYSTOLIC BLOOD PRESSURE: 130 MMHG | OXYGEN SATURATION: 98 % | TEMPERATURE: 98.2 F | BODY MASS INDEX: 28.76 KG/M2 | HEART RATE: 75 BPM | HEIGHT: 73 IN | RESPIRATION RATE: 16 BRPM

## 2017-07-16 DIAGNOSIS — M54.50 LOW BACK PAIN WITHOUT SCIATICA, UNSPECIFIED BACK PAIN LATERALITY, UNSPECIFIED CHRONICITY: ICD-10-CM

## 2017-07-16 DIAGNOSIS — N41.9 PROSTATITIS, UNSPECIFIED PROSTATITIS TYPE: ICD-10-CM

## 2017-07-16 DIAGNOSIS — M54.50 BILATERAL LOW BACK PAIN WITHOUT SCIATICA, UNSPECIFIED CHRONICITY: ICD-10-CM

## 2017-07-16 LAB
ALBUMIN UR-MCNC: NEGATIVE MG/DL
ANION GAP SERPL CALCULATED.3IONS-SCNC: 13 MMOL/L (ref 3–14)
APPEARANCE UR: CLEAR
BASOPHILS # BLD AUTO: 0 10E9/L (ref 0–0.2)
BASOPHILS NFR BLD AUTO: 0.2 %
BILIRUB UR QL STRIP: NEGATIVE
BUN SERPL-MCNC: 11 MG/DL (ref 7–30)
CALCIUM SERPL-MCNC: 9 MG/DL (ref 8.5–10.1)
CHLORIDE SERPL-SCNC: 107 MMOL/L (ref 94–109)
CO2 SERPL-SCNC: 21 MMOL/L (ref 20–32)
COLOR UR AUTO: YELLOW
CREAT SERPL-MCNC: 0.74 MG/DL (ref 0.66–1.25)
DIFFERENTIAL METHOD BLD: NORMAL
EOSINOPHIL # BLD AUTO: 0.1 10E9/L (ref 0–0.7)
EOSINOPHIL NFR BLD AUTO: 2.5 %
ERYTHROCYTE [DISTWIDTH] IN BLOOD BY AUTOMATED COUNT: 13 % (ref 10–15)
GFR SERPL CREATININE-BSD FRML MDRD: NORMAL ML/MIN/1.7M2
GLUCOSE SERPL-MCNC: 78 MG/DL (ref 70–99)
GLUCOSE UR STRIP-MCNC: NEGATIVE MG/DL
HCT VFR BLD AUTO: 42.4 % (ref 40–53)
HGB BLD-MCNC: 14.9 G/DL (ref 13.3–17.7)
HGB UR QL STRIP: ABNORMAL
IMM GRANULOCYTES # BLD: 0 10E9/L (ref 0–0.4)
IMM GRANULOCYTES NFR BLD: 0.2 %
KETONES UR STRIP-MCNC: NEGATIVE MG/DL
LEUKOCYTE ESTERASE UR QL STRIP: NEGATIVE
LYMPHOCYTES # BLD AUTO: 2.2 10E9/L (ref 0.8–5.3)
LYMPHOCYTES NFR BLD AUTO: 39.1 %
MCH RBC QN AUTO: 31.4 PG (ref 26.5–33)
MCHC RBC AUTO-ENTMCNC: 35.1 G/DL (ref 31.5–36.5)
MCV RBC AUTO: 90 FL (ref 78–100)
MONOCYTES # BLD AUTO: 0.7 10E9/L (ref 0–1.3)
MONOCYTES NFR BLD AUTO: 13.2 %
MUCOUS THREADS #/AREA URNS LPF: PRESENT /LPF
NEUTROPHILS # BLD AUTO: 2.5 10E9/L (ref 1.6–8.3)
NEUTROPHILS NFR BLD AUTO: 44.8 %
NITRATE UR QL: NEGATIVE
NRBC # BLD AUTO: 0 10*3/UL
NRBC BLD AUTO-RTO: 0 /100
PH UR STRIP: 5.5 PH (ref 5–7)
PLATELET # BLD AUTO: 260 10E9/L (ref 150–450)
POTASSIUM SERPL-SCNC: 3.6 MMOL/L (ref 3.4–5.3)
RBC # BLD AUTO: 4.74 10E12/L (ref 4.4–5.9)
RBC #/AREA URNS AUTO: 2 /HPF (ref 0–2)
SODIUM SERPL-SCNC: 141 MMOL/L (ref 133–144)
SP GR UR STRIP: 1.02 (ref 1–1.03)
URN SPEC COLLECT METH UR: ABNORMAL
UROBILINOGEN UR STRIP-MCNC: NORMAL MG/DL (ref 0–2)
WBC # BLD AUTO: 5.5 10E9/L (ref 4–11)
WBC #/AREA URNS AUTO: 1 /HPF (ref 0–2)

## 2017-07-16 PROCEDURE — 72131 CT LUMBAR SPINE W/O DYE: CPT

## 2017-07-16 PROCEDURE — 87591 N.GONORRHOEAE DNA AMP PROB: CPT | Performed by: FAMILY MEDICINE

## 2017-07-16 PROCEDURE — 87491 CHLMYD TRACH DNA AMP PROBE: CPT | Performed by: FAMILY MEDICINE

## 2017-07-16 PROCEDURE — 80048 BASIC METABOLIC PNL TOTAL CA: CPT | Performed by: FAMILY MEDICINE

## 2017-07-16 PROCEDURE — 85025 COMPLETE CBC W/AUTO DIFF WBC: CPT | Performed by: FAMILY MEDICINE

## 2017-07-16 PROCEDURE — 99284 EMERGENCY DEPT VISIT MOD MDM: CPT | Mod: Z6 | Performed by: FAMILY MEDICINE

## 2017-07-16 PROCEDURE — 81001 URINALYSIS AUTO W/SCOPE: CPT | Performed by: FAMILY MEDICINE

## 2017-07-16 PROCEDURE — 99284 EMERGENCY DEPT VISIT MOD MDM: CPT | Mod: 25 | Performed by: FAMILY MEDICINE

## 2017-07-16 RX ORDER — TAMSULOSIN HYDROCHLORIDE 0.4 MG/1
0.4 CAPSULE ORAL DAILY
Qty: 10 CAPSULE | Refills: 0 | Status: SHIPPED | OUTPATIENT
Start: 2017-07-16 | End: 2017-07-26

## 2017-07-16 RX ORDER — IBUPROFEN 800 MG/1
800 TABLET, FILM COATED ORAL EVERY 8 HOURS PRN
Qty: 20 TABLET | Refills: 0 | Status: SHIPPED | OUTPATIENT
Start: 2017-07-16 | End: 2017-07-24

## 2017-07-16 RX ORDER — DOXYCYCLINE 100 MG/1
100 CAPSULE ORAL 2 TIMES DAILY
Qty: 20 CAPSULE | Refills: 0 | Status: SHIPPED | OUTPATIENT
Start: 2017-07-16 | End: 2017-07-26

## 2017-07-16 NOTE — ED AVS SNAPSHOT
[facility], Emergency Department    [address]    [place] [address]    Phone:  [phone]    Fax:  [phone]                                       Harry Gonzales   MRN: [id number]    Department:  [facility], Emergency Department   Date of Visit:  7/16/2017           Patient Information     Date Of Birth          1985        Your diagnoses for this visit were:     Prostatitis, unspecified prostatitis type     Bilateral low back pain without sciatica, unspecified chronicity        You were seen by Tres Roche MD.        Discharge Instructions       Thank you for choosing [facility].     Please closely monitor for further symptoms. Return to the Emergency Department if you develop any new or worsening signs or symptoms.    If you received any opiate pain medications or sedatives during your visit, please do not drive for at least 8 hours.     Labs, cultures or final xray interpretations may still need to be reviewed.  We will call you if your plan of care needs to be changed.    Please make an appointment to follow up with Your Primary Care Provider regarding her back difficulty, and Urology Clinic (phone: [phone]) in next 7-10 days if your urinary symptoms do not improve.        Discharge References/Attachments     PROSTATITIS (ENGLISH)      24 Hour Appointment Hotline       To make an appointment at any [facility] clinic, call [phone] ([phone]). If you don't have a family doctor or clinic, we will help you find one. [facility] clinics are conveniently located to serve the needs of you and your family.             Review of your medicines      START taking        Dose / Directions Last dose taken    doxycycline 100 MG capsule   Commonly known as:  VIBRAMYCIN   Dose:  100 mg   Quantity:  20 capsule        Take 1 capsule (100 mg) by mouth 2 times daily for 10 days   Refills:  0        tamsulosin 0.4 MG capsule   Commonly known as:  FLOMAX   Dose:   0.4 mg   Quantity:  10 capsule        Take 1 capsule (0.4 mg) by mouth daily for 10 doses   Refills:  0          CONTINUE these medicines which may have CHANGED, or have new prescriptions. If we are uncertain of the size of tablets/capsules you have at home, strength may be listed as something that might have changed.        Dose / Directions Last dose taken    ibuprofen 800 MG tablet   Commonly known as:  ADVIL/MOTRIN   Dose:  800 mg   What changed:    - medication strength  - how much to take  - when to take this   Quantity:  20 tablet        Take 1 tablet (800 mg) by mouth every 8 hours as needed for moderate pain   Refills:  0          Our records show that you are taking the medicines listed below. If these are incorrect, please call your family doctor or clinic.        Dose / Directions Last dose taken    * hydrocortisone 1 % ointment   Quantity:  30 g        Apply sparingly to affected area two times daily for 7 days.   Refills:  0        * hydrocortisone 1 % cream   Commonly known as:  CORTAID   Quantity:  30 g        Apply sparingly to affected area three times daily for 14 days.   Refills:  0        levETIRAcetam 500 MG tablet   Commonly known as:  KEPPRA   Dose:  500 mg   Quantity:  60 tablet        Take 1 tablet (500 mg) by mouth 2 times daily Needs to schedule with PCP prior to further refills.   Refills:  5        loperamide 2 MG tablet   Commonly known as:  IMODIUM A-D   Dose:  2 mg   Quantity:  20 tablet        Take 1 tablet (2 mg) by mouth 4 times daily as needed for diarrhea   Refills:  0        loratadine 10 MG tablet   Commonly known as:  CLARITIN   Dose:  10 mg   Quantity:  30 tablet        Take 1 tablet (10 mg) by mouth daily   Refills:  11        OMEGA-3 FISH OIL PO        Take by mouth daily   Refills:  0        ondansetron 4 MG tablet   Commonly known as:  ZOFRAN   Dose:  4 mg   Quantity:  18 tablet        Take 1 tablet (4 mg) by mouth every 8 hours as needed for nausea   Refills:  1         psyllium 0.52 G capsule   Dose:  1 capsule   Quantity:  100 capsule        Take 1 capsule (0.52 g) by mouth At Bedtime   Refills:  1        terbinafine 250 MG tablet   Commonly known as:  lamISIL   Dose:  250 mg   Quantity:  14 tablet        Take 1 tablet (250 mg) by mouth daily   Refills:  0        vitamin D 2000 UNITS tablet   Dose:  2000 Units   Quantity:  100 tablet        Take 2,000 Units by mouth daily   Refills:  3        * Notice:  This list has 2 medication(s) that are the same as other medications prescribed for you. Read the directions carefully, and ask your doctor or other care provider to review them with you.      STOP taking        Dose Reason for stopping Comments    cyclobenzaprine 5 MG tablet   Commonly known as:  FLEXERIL                      Prescriptions were sent or printed at these locations (3 Prescriptions)                   Other Prescriptions                Printed at Department/Unit printer (3 of 3)         doxycycline (VIBRAMYCIN) 100 MG capsule               ibuprofen (ADVIL/MOTRIN) 800 MG tablet               tamsulosin (FLOMAX) 0.4 MG capsule                Procedures and tests performed during your visit     Basic metabolic panel    CBC with platelets differential    Chlamydia trachomatis PCR    Lumbar spine CT w/o contrast    Neisseria gonorrhoeae PCR    UA with Microscopic reflex to Culture      Orders Needing Specimen Collection     None      Pending Results     Date and Time Order Name Status Description    7/16/2017 1837 Lumbar spine CT w/o contrast Preliminary     7/16/2017 1759 Neisseria gonorrhoeae PCR In process     7/16/2017 1759 Chlamydia trachomatis PCR In process             Pending Culture Results     Date and Time Order Name Status Description    7/16/2017 1759 Neisseria gonorrhoeae PCR In process     7/16/2017 1759 Chlamydia trachomatis PCR In process             Pending Results Instructions     If you had any lab results that were not finalized at the time of your  "Discharge, you can call the ED Lab Result RN at [phone]. You will be contacted by this team for any positive Lab results or changes in treatment. The nurses are available 7 days a week from 10A to 6:30P.  You can leave a message 24 hours per day and they will return your call.        Thank you for choosing [facility]       Thank you for choosing [facility] for your care. Our goal is always to provide you with excellent care. Hearing back from our patients is one way we can continue to improve our services. Please take a few minutes to complete the written survey that you may receive in the mail after you visit with us. Thank you!        [facility] Information     [employer] lets you send messages to your doctor, view your test results, renew your prescriptions, schedule appointments and more. To sign up, go to [email or web address] . Click on \"Log in\" on the left side of the screen, which will take you to the Welcome page. Then click on \"Sign up Now\" on the right side of the page.     You will be asked to enter the access code listed below, as well as some personal information. Please follow the directions to create your username and password.     Your access code is: [email or web address]  Expires: 2017  3:38 PM     Your access code will  in 90 days. If you need help or a new code, please call your [facility] clinic or [phone].        Care EveryWhere ID     This is your Care EveryWhere ID. This could be used by other organizations to access your [facility] medical records  [id number]        Equal Access to Services     [facility] ZION : Hadii baltazar menendezo Sovon, waaxda luqadaha, qaybta kaalmada adedeniseyaalphonso, binu thayer . So [facility] [phone].    ATENCIÓN: Si habla español, tiene a grider disposición servicios gratuitos de asistencia lingüística. Llame al [phone].    We comply with applicable federal civil rights laws and [place] laws. We do not discriminate on the basis of race, " color, national origin, age, disability sex, sexual orientation or gender identity.            After Visit Summary       This is your record. Keep this with you and show to your community pharmacist(s) and doctor(s) at your next visit.

## 2017-07-16 NOTE — ED AVS SNAPSHOT
[facility], Emergency Department    [address]    [place] [address]    Phone:  [phone]    Fax:  [phone]                                       Harry Gonzales   MRN: [id number]    Department:  [facility], Emergency Department   Date of Visit:  7/16/2017           After Visit Summary Signature Page     I have received my discharge instructions, and my questions have been answered. I have discussed any challenges I see with this plan with the nurse or doctor.    ..........................................................................................................................................  Patient/Patient Representative Signature      ..........................................................................................................................................  Patient Representative Print Name and Relationship to Patient    ..................................................               ................................................  Date                                            Time    ..........................................................................................................................................  Reviewed by Signature/Title    ...................................................              ..............................................  Date                                                            Time

## 2017-07-17 ENCOUNTER — TELEPHONE (OUTPATIENT)
Dept: FAMILY MEDICINE | Facility: CLINIC | Age: 32
End: 2017-07-17

## 2017-07-17 LAB
C TRACH DNA SPEC QL NAA+PROBE: NORMAL
N GONORRHOEA DNA SPEC QL NAA+PROBE: NORMAL
SPECIMEN SOURCE: NORMAL
SPECIMEN SOURCE: NORMAL

## 2017-07-17 NOTE — DISCHARGE INSTRUCTIONS
Thank you for choosing [facility].     Please closely monitor for further symptoms. Return to the Emergency Department if you develop any new or worsening signs or symptoms.    If you received any opiate pain medications or sedatives during your visit, please do not drive for at least 8 hours.     Labs, cultures or final xray interpretations may still need to be reviewed.  We will call you if your plan of care needs to be changed.    Please make an appointment to follow up with Your Primary Care Provider regarding her back difficulty, and Urology Clinic (phone: [phone]) in next 7-10 days if your urinary symptoms do not improve.

## 2017-07-17 NOTE — TELEPHONE ENCOUNTER
Reason for Call:   fall    Detailed comments: Pt states that he fell a few weeks ago. Pt states that he was informed by ED to follow up with his PCP.      Phone Number Patient can be reached at: Home number on file [phone] (home)    Best Time: Anytime    Can we leave a detailed message on this number? YES    Call taken on 7/17/2017 at 12:07 PM by Melisa Fatima

## 2017-07-18 NOTE — TELEPHONE ENCOUNTER
Called patient to assist with scheduling f/u appt. No answer, left VM requesting call back.  Patient did have an appt that writer assisted scheduling for him on 7/5 post fall and patient no showed.  Leyda Jason RN

## 2017-07-21 ENCOUNTER — OFFICE VISIT (OUTPATIENT)
Dept: FAMILY MEDICINE | Facility: CLINIC | Age: 32
End: 2017-07-21
Payer: COMMERCIAL

## 2017-07-21 VITALS
TEMPERATURE: 98 F | WEIGHT: 217 LBS | RESPIRATION RATE: 14 BRPM | HEART RATE: 83 BPM | DIASTOLIC BLOOD PRESSURE: 86 MMHG | OXYGEN SATURATION: 96 % | BODY MASS INDEX: 28.63 KG/M2 | SYSTOLIC BLOOD PRESSURE: 118 MMHG

## 2017-07-21 DIAGNOSIS — M53.3 SI (SACROILIAC) JOINT DYSFUNCTION: Primary | ICD-10-CM

## 2017-07-21 PROCEDURE — 99213 OFFICE O/P EST LOW 20 MIN: CPT | Performed by: FAMILY MEDICINE

## 2017-07-21 NOTE — TELEPHONE ENCOUNTER
Patient scheduled for appointment today. Closing encounter at this time and will follow up as needed.  Leyda Jason RN

## 2017-07-21 NOTE — PROGRESS NOTES
SUBJECTIVE:                                                    Harry Gonzales is a 32 year old male who presents to clinic today for the following health issues:    Back Pain Follow Up      Description: Recent fall   Location of pain:  Center lower back.  Character of pain: burning and a little sharp sometimes on the side.  Pain radiation: Does not radiate to legs but sometimes the buttocks.  Since last visit, pain is:  Recent ED visit.  Better now to some degree since ED visit. Patient reports that he fell on his back last June. Demonstrated how he fell. Reviewed exams done when pt went to the ED, Charts Reviewed. Pt's pain is localized in the lower bilateral back. Has only been taking Ibuprofen when needed. Pt thinks that he needs a different medication because his pain is worse on some days. 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 He likes to be active and can't walk long distances without being in pain. Charts Reviewed. reviewed his x-ray and CT results:   Minimal disc height loss and broad-based disc bulge at L4-5 with ligament flavum buckling causing mild central canal narrowing and mild bilateral neural foraminal narrowing. There was also noted mild degenerative changes of the bilateral SI joints, with no other significant abnormalities.  New numbness or weakness in legs, not attributed to pain:  no     Intensity: Currently 0-1/10    History:   Pain interferes with job: sometimes.  Therapies tried without relief: NA  Therapies tried with relief: Taking Ibuprofen.         Accompanying Signs & Symptoms:  Risk of Fracture:  None  Risk of Cauda Equina:  None  Risk of Infection:  None  Risk of Cancer:  None        Social History   Substance Use Topics     Smoking status: Never Smoker     Smokeless tobacco: Never Used     Alcohol use No      Problem list and histories reviewed & adjusted, as indicated.  Additional history: as documented    Patient Active Problem List   Diagnosis     Seizure disorder (H)      Health maintenance examination     CARDIOVASCULAR SCREENING; LDL GOAL LESS THAN 160     Elevated blood pressure reading without diagnosis of hypertension     History of kidney stones     Seasonal allergic rhinitis due to fungal spores     Acute bilateral low back pain without sciatica     History reviewed. No pertinent surgical history.    Social History   Substance Use Topics     Smoking status: Never Smoker     Smokeless tobacco: Never Used     Alcohol use No     Family History   Problem Relation Age of Onset     Hypertension Father            Current Outpatient Prescriptions   Medication Sig Dispense Refill     doxycycline (VIBRAMYCIN) 100 MG capsule Take 1 capsule (100 mg) by mouth 2 times daily for 10 days 20 capsule 0     ibuprofen (ADVIL/MOTRIN) 800 MG tablet Take 1 tablet (800 mg) by mouth every 8 hours as needed for moderate pain 20 tablet 0     tamsulosin (FLOMAX) 0.4 MG capsule Take 1 capsule (0.4 mg) by mouth daily for 10 doses 10 capsule 0     levETIRAcetam (KEPPRA) 500 MG tablet Take 1 tablet (500 mg) by mouth 2 times daily Needs to schedule with PCP prior to further refills. 60 tablet 5     loratadine (CLARITIN) 10 MG tablet Take 1 tablet (10 mg) by mouth daily 30 tablet 11     psyllium 0.52 G capsule Take 1 capsule (0.52 g) by mouth At Bedtime 100 capsule 1     hydrocortisone (CORTAID) 1 % cream Apply sparingly to affected area three times daily for 14 days. 30 g 0     Omega-3 Fatty Acids (OMEGA-3 FISH OIL PO) Take by mouth daily       ondansetron (ZOFRAN) 4 MG tablet Take 1 tablet (4 mg) by mouth every 8 hours as needed for nausea 18 tablet 1     loperamide (IMODIUM A-D) 2 MG tablet Take 1 tablet (2 mg) by mouth 4 times daily as needed for diarrhea 20 tablet 0     hydrocortisone 1 % ointment Apply sparingly to affected area two times daily for 7 days. 30 g 0     terbinafine (LAMISIL) 250 MG tablet Take 1 tablet (250 mg) by mouth daily 14 tablet 0     Cholecalciferol (VITAMIN D) 2000 UNITS tablet  Take 2,000 Units by mouth daily 100 tablet 3     Allergies   Allergen Reactions     Nkda [No Known Drug Allergies]      Seasonal Allergies      Recent Labs   Lab Test  07/16/17   1852  11/08/16   1639   01/22/15   1417  05/21/14   1650  11/04/13   1656   LDL   --   134*   --   145*   --    --    HDL   --    --    --   60   --    --    TRIG   --    --    --   112   --    --    ALT   --   35   --    --    --   33   CR  0.74  0.76   < >   --    --   0.73   GFRESTIMATED  >90  Non  GFR Calc    >90  Non  GFR Calc     < >   --    --   >90   GFRESTBLACK  >90   GFR Calc    >90   GFR Calc     < >   --    --   >90   POTASSIUM  3.6  3.8   < >   --    --   4.6   TSH   --    --    --    --   0.71   --     < > = values in this interval not displayed.      BP Readings from Last 3 Encounters:   07/21/17 118/86   07/16/17 (!) 130/94   06/09/17 113/77    Wt Readings from Last 3 Encounters:   07/21/17 98.4 kg (217 lb)   07/16/17 98.4 kg (217 lb)   06/09/17 99.7 kg (219 lb 12.8 oz)        Labs reviewed in EPIC  Problem list, Medication list, Allergies, and Medical/Social/Surgical histories reviewed in [facility] and updated as appropriate.     ROS: 10 point ROS neg other than the symptoms noted above in the HPI.    This document serves as a record of the services and decisions personally performed and made by Leona Sanchez CNP. It was created on her behalf by Shaheen Lynn, a trained medical scribe. The creation of this document is based on the provider's statements to the medical scribe.  Shaheen Lynn 3:10 PM 7/21/2017    OBJECTIVE:                                                    /86  Pulse 83  Temp 98  F (36.7  C) (Oral)  Resp 14  Wt 98.4 kg (217 lb)  SpO2 96%  BMI 28.63 kg/m2  Body mass index is 28.63 kg/(m^2).   GENERAL: Healthy, alert, well nourished, well hydrated, no distress  BACK: no CVA tenderness, no paralumbar tenderness, pain to palpation  bilateral sacroiliac, joints, right > left.  no paraspinous muscle spasms.    DTRs equal.  Muscle strength equal.           ASSESSMENT/PLAN:                                                    (M53.3) SI (sacroiliac) joint dysfunction  (primary encounter diagnosis)  Comment: since a fall in June  Plan: LANE PT, HAND, AND CHIROPRACTIC REFERRAL        Followup with physical therapy for pain relief.    The information in this document, created by the medical scribe, Shaheen Lynn, for me, accurately reflects the services I personally performed and the decisions made by me. I have reviewed and approved this document for accuracy prior to leaving the patient care area.    Leona Sanchez MD, MD  [facility]

## 2017-07-21 NOTE — NURSING NOTE
"Chief Complaint   Patient presents with     RECHECK       Initial /86  Pulse 83  Temp 98  F (36.7  C) (Oral)  Resp 14  Wt 217 lb (98.4 kg)  SpO2 96%  BMI 28.63 kg/m2 Estimated body mass index is 28.63 kg/(m^2) as calculated from the following:    Height as of 7/16/17: 6' 1\" (1.854 m).    Weight as of this encounter: 217 lb (98.4 kg).  Medication Reconciliation: complete     Gume Chan, NATALIE    "

## 2017-07-21 NOTE — MR AVS SNAPSHOT
After Visit Summary   7/21/2017    Harry Gonzales    MRN: [id number]           Patient Information     Date Of Birth          1985        Visit Information        Provider Department      7/21/2017 2:30 PM Leona Sanchez MD [facility]        Today's Diagnoses     SI (sacroiliac) joint dysfunction    -  1       Follow-ups after your visit        Additional Services     LANE PT, HAND, AND CHIROPRACTIC REFERRAL       **This order will print in the LANE Scheduling Office**    Physical Therapy, Hand Therapy and Chiropractic Care are available through:    *[facility]  *[facility]  *[facility]    Call one number to schedule at any of the above locations: [phone].    Your provider has referred you to:     Indication/Reason for Referral: right sacrioiliac pain  Onset of Illness: fell in [place]  Therapy Orders: Evaluate and Treat  Special Programs:   Special Request:     Katrin Wilkes      Additional Comments for the Therapist or Chiropractor: right SI dysfunction    Please be aware that coverage of these services is subject to the terms and limitations of your health insurance plan.  Call member services at your health plan with any benefit or coverage questions.      Please bring the following to your appointment:    *Your personal calendar for scheduling future appointments  *Comfortable clothing                  Who to contact     If you have questions or need follow up information about today's clinic visit or your schedule please contact [facility] directly at [phone].  Normal or non-critical lab and imaging results will be communicated to you by MyChart, letter or phone within 4 business days after the clinic has received the results. If you do not hear from us within 7 days, please contact the clinic through MyChart or phone. If you have a critical or abnormal lab result,  "we will notify you by phone as soon as possible.  Submit refill requests through [employer] or call your pharmacy and they will forward the refill request to us. Please allow 3 business days for your refill to be completed.          Additional Information About Your Visit        [employer] Information     [employer] lets you send messages to your doctor, view your test results, renew your prescriptions, schedule appointments and more. To sign up, go to [email or web address] . Click on \"Log in\" on the left side of the screen, which will take you to the Welcome page. Then click on \"Sign up Now\" on the right side of the page.     You will be asked to enter the access code listed below, as well as some personal information. Please follow the directions to create your username and password.     Your access code is: [email or web address]  Expires: 2017  3:38 PM     Your access code will  in 90 days. If you need help or a new code, please call your [place] clinic or [phone].        Care EveryWhere ID     This is your Care EveryWhere ID. This could be used by other organizations to access your [place] medical records  [id number]        Your Vitals Were     Pulse Temperature Respirations Pulse Oximetry BMI (Body Mass Index)       83 98  F (36.7  C) (Oral) 14 96% 28.63 kg/m2        Blood Pressure from Last 3 Encounters:   17 118/86   17 (!) 130/94   17 113/77    Weight from Last 3 Encounters:   17 98.4 kg (217 lb)   17 98.4 kg (217 lb)   17 99.7 kg (219 lb 12.8 oz)              We Performed the Following     LANE PT, HAND, AND CHIROPRACTIC REFERRAL        Primary Care Provider    Physician No Ref-Primary       No address on file        Equal Access to Services     PHILIP DONOVAN : Aixa Upton, wavance herron, qaybta kaalbrissa bishop, binu garber. So [facility] [phone].    ATENCIÓN: Si habla español, tiene a grider disposición servicios gratuitos " de asistencia lingüística. Romel luna [phone].    We comply with applicable federal civil rights laws and [place] laws. We do not discriminate on the basis of race, color, national origin, age, disability sex, sexual orientation or gender identity.            Thank you!     Thank you for choosing [facility]  for your care. Our goal is always to provide you with excellent care. Hearing back from our patients is one way we can continue to improve our services. Please take a few minutes to complete the written survey that you may receive in the mail after your visit with us. Thank you!             Your Updated Medication List - Protect others around you: Learn how to safely use, store and throw away your medicines at www.disposemymeds.org.          This list is accurate as of: 7/21/17  3:18 PM.  Always use your most recent med list.                   Brand Name Dispense Instructions for use Diagnosis    doxycycline 100 MG capsule    VIBRAMYCIN    20 capsule    Take 1 capsule (100 mg) by mouth 2 times daily for 10 days        * hydrocortisone 1 % ointment     30 g    Apply sparingly to affected area two times daily for 7 days.    Balanitis       * hydrocortisone 1 % cream    CORTAID    30 g    Apply sparingly to affected area three times daily for 14 days.    Itchy skin       ibuprofen 800 MG tablet    ADVIL/MOTRIN    20 tablet    Take 1 tablet (800 mg) by mouth every 8 hours as needed for moderate pain        levETIRAcetam 500 MG tablet    KEPPRA    60 tablet    Take 1 tablet (500 mg) by mouth 2 times daily Needs to schedule with PCP prior to further refills.    Seizure disorder (H)       loperamide 2 MG tablet    IMODIUM A-D    20 tablet    Take 1 tablet (2 mg) by mouth 4 times daily as needed for diarrhea    Loose stools       loratadine 10 MG tablet    CLARITIN    30 tablet    Take 1 tablet (10 mg) by mouth daily    Seasonal allergic rhinitis due to fungal spores       OMEGA-3 FISH  OIL PO      Take by mouth daily        ondansetron 4 MG tablet    ZOFRAN    18 tablet    Take 1 tablet (4 mg) by mouth every 8 hours as needed for nausea    Nausea       psyllium 0.52 G capsule     100 capsule    Take 1 capsule (0.52 g) by mouth At Bedtime    Irritable bowel syndrome with diarrhea       tamsulosin 0.4 MG capsule    FLOMAX    10 capsule    Take 1 capsule (0.4 mg) by mouth daily for 10 doses        terbinafine 250 MG tablet    lamISIL    14 tablet    Take 1 tablet (250 mg) by mouth daily        vitamin D 2000 UNITS tablet     100 tablet    Take 2,000 Units by mouth daily    Fatigue       * Notice:  This list has 2 medication(s) that are the same as other medications prescribed for you. Read the directions carefully, and ask your doctor or other care provider to review them with you.

## 2017-07-26 DIAGNOSIS — G40.909 SEIZURE DISORDER (H): ICD-10-CM

## 2017-07-27 RX ORDER — LEVETIRACETAM 500 MG/1
TABLET ORAL
Qty: 60 TABLET | Refills: 3 | Status: SHIPPED | OUTPATIENT
Start: 2017-07-27 | End: 2017-12-15

## 2017-07-27 NOTE — TELEPHONE ENCOUNTER
Keppra     Last Written Prescription Date: 1/2/17  Last Fill Quantity: 60,  # refills: 5  Last Office Visit with G, P or [facility] prescribing provider: 7/21/17

## 2017-08-02 ENCOUNTER — TELEPHONE (OUTPATIENT)
Dept: FAMILY MEDICINE | Facility: CLINIC | Age: 32
End: 2017-08-02

## 2017-08-02 NOTE — TELEPHONE ENCOUNTER
Reason for call:  Patient reporting a symptom    Symptom or request: Frequency Urination     Duration (how long have symptoms been present): about 1 week     Have you been treated for this before? Yes    Additional comments: pt wants a referral to see a Urologist, also pt wants a call back as soon as possible from a nurse     Phone Number patient can be reached at:  Home number on file [phone] (home)    Best Time:  anytime    Can we leave a detailed message on this number:  YES    Call taken on 8/2/2017 at 2:59 PM by Joaquin Donohue

## 2017-08-03 NOTE — TELEPHONE ENCOUNTER
Writer attempted to call pt, No answer.  LVM for Pt to call writer back at [phone].    Nacho Snider RN

## 2017-08-25 ENCOUNTER — OFFICE VISIT (OUTPATIENT)
Dept: UROLOGY | Facility: CLINIC | Age: 32
End: 2017-08-25
Payer: COMMERCIAL

## 2017-08-25 VITALS — SYSTOLIC BLOOD PRESSURE: 135 MMHG | DIASTOLIC BLOOD PRESSURE: 92 MMHG | HEART RATE: 79 BPM

## 2017-08-25 DIAGNOSIS — R33.9 INCOMPLETE BLADDER EMPTYING: ICD-10-CM

## 2017-08-25 DIAGNOSIS — R35.0 URINARY FREQUENCY: Primary | ICD-10-CM

## 2017-08-25 DIAGNOSIS — R35.1 NOCTURIA: ICD-10-CM

## 2017-08-25 LAB
ALBUMIN UR-MCNC: NEGATIVE MG/DL
AMORPH CRY #/AREA URNS HPF: ABNORMAL /HPF
APPEARANCE UR: NORMAL
BILIRUB UR QL STRIP: NEGATIVE
COLOR UR AUTO: YELLOW
GLUCOSE UR STRIP-MCNC: NEGATIVE MG/DL
HGB UR QL STRIP: NEGATIVE
KETONES UR STRIP-MCNC: NEGATIVE MG/DL
LEUKOCYTE ESTERASE UR QL STRIP: NEGATIVE
NITRATE UR QL: NEGATIVE
PH UR STRIP: 7 PH (ref 5–7)
RBC #/AREA URNS AUTO: ABNORMAL /HPF
SOURCE: NORMAL
SP GR UR STRIP: 1.01 (ref 1–1.03)
UROBILINOGEN UR STRIP-MCNC: NORMAL MG/DL (ref 0–2)
WBC #/AREA URNS AUTO: ABNORMAL /HPF

## 2017-08-25 PROCEDURE — 51798 US URINE CAPACITY MEASURE: CPT | Performed by: PHYSICIAN ASSISTANT

## 2017-08-25 PROCEDURE — 99203 OFFICE O/P NEW LOW 30 MIN: CPT | Mod: 25 | Performed by: PHYSICIAN ASSISTANT

## 2017-08-25 PROCEDURE — 81001 URINALYSIS AUTO W/SCOPE: CPT | Performed by: PHYSICIAN ASSISTANT

## 2017-08-25 RX ORDER — TAMSULOSIN HYDROCHLORIDE 0.4 MG/1
0.4 CAPSULE ORAL DAILY
Qty: 30 CAPSULE | Refills: 5 | Status: SHIPPED | OUTPATIENT
Start: 2017-08-25 | End: 2018-12-12

## 2017-08-25 ASSESSMENT — PAIN SCALES - GENERAL: PAINLEVEL: NO PAIN (0)

## 2017-08-25 NOTE — PROGRESS NOTES
"CC: urinary frequency, nocturia.    HPI: It is a pleasure to see . Harry Gonzales, a very pleasant 32 year old male who presents via self-referral for evaluation of the above. Symptoms have been ongoing for 1 year. The patient voids q1-2 hours during the day, nocturia x 3-5. He also reports some sensation of incomplete emptying. Denies hesitancy, intermittency, dysuria, gross hematuria, perineal pain/pressure, or fevers, chills, N/V. He was seen through the ER in July where he was diagnosed with prostatitis and treated with 10 days of Doxycycline and Flomax. He thinks this may have helped somewhat.  No history of UTI's or kidney stones.     He does report drinking a large amount of coffee daily to which he adds \"a cup of sugar.\" He knows he probably drinks too much sugar and caffeine. No history of diabetes. Blood glucose on recent blood work through the ER was normal.      Past Medical History:   Diagnosis Date     Allergic state      Seizures (H)      No past surgical history on file.  Current Outpatient Prescriptions   Medication Sig Dispense Refill     levETIRAcetam (KEPPRA) 500 MG tablet TAKE ONE TABLET BY MOUTH TWICE DAILY  60 tablet 3     psyllium 0.52 G capsule Take 1 capsule (0.52 g) by mouth At Bedtime 100 capsule 1     loratadine (CLARITIN) 10 MG tablet Take 1 tablet (10 mg) by mouth daily (Patient not taking: Reported on 8/25/2017) 30 tablet 11     Omega-3 Fatty Acids (OMEGA-3 FISH OIL PO) Take by mouth daily       ondansetron (ZOFRAN) 4 MG tablet Take 1 tablet (4 mg) by mouth every 8 hours as needed for nausea 18 tablet 1     loperamide (IMODIUM A-D) 2 MG tablet Take 1 tablet (2 mg) by mouth 4 times daily as needed for diarrhea 20 tablet 0     terbinafine (LAMISIL) 250 MG tablet Take 1 tablet (250 mg) by mouth daily 14 tablet 0     Cholecalciferol (VITAMIN D) 2000 UNITS tablet Take 2,000 Units by mouth daily 100 tablet 3     Allergies   Allergen Reactions     Nkda [No Known Drug Allergies]      Seasonal " Allergies      FAMILY HISTORY: The patient denies history of genitourinary carcinoma.  There is no history of urolithiasis.    SOCIAL HISTORY: The patient does not smoke cigarettes, no EtOH and no illicit drug use.    ROS: A comprehensive 14 point ROS was obtained and was positive for HTN, h/o seizure disorder and otherwise negative except for that outlined above in the HPI.    PHYSICAL EXAM:   Vitals:    08/25/17 1205   BP: (!) 135/92   BP Location: Left arm   Patient Position: Chair   Cuff Size: Adult Large   Pulse: 79     GENERAL: Well groomed/well developed/well nourished male in NAD.  HEENT: EOMI, AT, NC.  SKIN: Warm to touch, dry.  No visible rashes or lesions.  RESP: No increased respiratory effort.  LYMPH: No LE edema.  MS: Full ROM in extremities.  : Deferred.  ERNESTO: Deferred.  NEURO: Alert and oriented x 3.  PSYCH: Normal mood and affect, pleasant and agreeable during interview and exam.    Residual urine by ultrasound was 48 ml.      UA today completely negative.     Last Basic Metabolic Panel:  Lab Results   Component Value Date     07/16/2017      Lab Results   Component Value Date    POTASSIUM 3.6 07/16/2017     Lab Results   Component Value Date    CHLORIDE 107 07/16/2017     Lab Results   Component Value Date    AMBER 9.0 07/16/2017     Lab Results   Component Value Date    CO2 21 07/16/2017     Lab Results   Component Value Date    BUN 11 07/16/2017     Lab Results   Component Value Date    CR 0.74 07/16/2017     Lab Results   Component Value Date    GLC 78 07/16/2017       IMAGING:   LUMBAR SPINE CT WITHOUT CONTRAST 7/16/2017   IMPRESSION:  1. Minimal disc height loss and broad-based disc bulge at L4-5 along  with ligament flavum buckling causes mild central canal narrowing. The  disc bulge also causes mild bilateral neural foraminal narrowing.  Recommend clinical correlation with site of symptoms.  2. Mild degenerative changes of the bilateral SI joints.  3. No other significant abnormalities  are identified.  4. Further evaluation with MRI lumbar spine may be helpful, as  clinically indicated, as MRI has better soft tissue contrast  resolution than CT.  5. No fracture.    ASSESSMENT/PLAN:  Mr. Harry Gonzales is a very pleasant 32 year old male with urinary frequency, nocturia, and intermittent sensation of incomplete emptying. PVR today is reasonable at 48 mL so does not appear to be acutely retaining. He does admit to drinking a large amount of caffeinated and sugary beverages every day. Suspect this is largely contributing to symptoms. Also need to consider possible neurological etiology given history of seizure disorder and recent lumbar spine CT which showed some disc bulging at L4-5 with mild central canal narrowing and mild bilateral neural foraminal narrowing.     Would recommend step-wise approach, starting with behavioral modifications.  -Needs to cut way back on sugary and caffeineated beverages. Try switching to decaf coffee.  -Limit fluids before bed  -Work on timed and double voiding  -He felt 10 days of Flomax as prescribed by ER physician in July were somewhat helpful. Will therefore prescribe tamsulosin 0.4 mg daily to be taken before bedtime to see if this helps with sensation of incomplete emptying. Side effects discussed.    Recommend follow up in 2 months for uroflow/PVR. 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 He will plan to follow up then.   -If no improvement with behavioral modifications/Flomax, then consider trial of an anticholinergic medication or further evaluation with video urodynamics.     I have enjoyed participating in the medical care of this very pleasant patient.  Please don't hesitate to contact me with any questions or concerns.     Becca Rose PA-C  Department of Urology

## 2017-08-25 NOTE — NURSING NOTE
"Harry Gonzales's goals for this visit include:   Chief Complaint   Patient presents with     Consult     urinary urgency and frequency, possible kidney stone     He requests these members of his care team be copied on today's visit information: YES - PCP     Initial BP (!) 135/92 (BP Location: Left arm, Patient Position: Chair, Cuff Size: Adult Large)  Pulse 79 Estimated body mass index is 28.63 kg/(m^2) as calculated from the following:    Height as of 7/16/17: 1.854 m (6' 1\").    Weight as of 7/21/17: 98.4 kg (217 lb).  BP completed using cuff size: large    post void residual - 48cc        "

## 2017-08-25 NOTE — MR AVS SNAPSHOT
After Visit Summary   8/25/2017    Harry Gonzales    MRN: [id number]           Patient Information     Date Of Birth          1985        Visit Information        Provider Department      8/25/2017 11:30 AM Becca Rose PA-C [facility]        Today's Diagnoses     History of kidney stones    -  1    Incomplete bladder emptying          Care Instructions    UROLOGY CLINIC VISIT PATIENT INSTRUCTIONS    1) Work on cutting back on the sugar and caffeine.     Below is a list of foods that can irritate the bladder:      Caffeinated soft drinks.    Coffee.    Tea.    Chocolate.    Tomato-based foods.    Acidic juices and fruits.    Alcohol.    Carbonated drinks.    Aspartame/Nutrasweet.    2) Try the Flomax (tamsulosin) medication to see if this helps you empty your bladder better and thus have to go less often.       If you have any issues, questions or concerns in the meantime, do not hesitate to contact us at [phone] or via [employer].     It was a pleasure meeting with you today.  Thank you for allowing me and my team the privilege of caring for you today.  YOU are the reason we are here, and I truly hope we provided you with the excellent service you deserve.  Please let us know if there is anything else we can do for you so that we can be sure you are leaving completely satisfied with your care experience.                Follow-ups after your visit        Your next 10 appointments already scheduled     Dec 22, 2017  2:30 PM CST   Return Visit with HEMANTH Guadarrama [facility] ([facility])    [address] [place] [address] [phone]              Who to contact     If you have questions or need follow up information about today's clinic visit or your schedule please contact [facility] directly at [phone].  Normal or non-critical lab and imaging results will be communicated to you by  MyChart, letter or phone within 4 business days after the clinic has received the results. If you do not hear from us within 7 days, please contact the clinic through [employer] or phone. If you have a critical or abnormal lab result, we will notify you by phone as soon as possible.  Submit refill requests through [employer] or call your pharmacy and they will forward the refill request to us. Please allow 3 business days for your refill to be completed.          Additional Information About Your Visit        [employer] Information     [employer] is an electronic gateway that provides easy, online access to your medical records. With [employer], you can request a clinic appointment, read your test results, renew a prescription or communicate with your care team.     To sign up for [employer] visit the website at [email or web address]   You will be asked to enter the access code listed below, as well as some personal information. Please follow the directions to create your username and password.     Your access code is: [email or web address]  Expires: 2017  3:38 PM     Your access code will  in 90 days. If you need help or a new code, please contact your [facility] or call [phone] for assistance.        Care EveryWhere ID     This is your Care EveryWhere ID. This could be used by other organizations to access your [facility] medical records  [id number]        Your Vitals Were     Pulse                   79            Blood Pressure from Last 3 Encounters:   17 (!) 135/92   17 118/86   17 (!) 130/94    Weight from Last 3 Encounters:   17 98.4 kg (217 lb)   17 98.4 kg (217 lb)   17 99.7 kg (219 lb 12.8 oz)              We Performed the Following     MEASURE POST-VOID RESIDUAL URINE/BLADDER CAPACITY, US NON-IMAGING     UA reflex to Microscopic and Culture     Urine Microscopic          Today's Medication Changes          These changes are accurate as of:  8/25/17 12:29 PM.  If you have any questions, ask your nurse or doctor.               Start taking these medicines.        Dose/Directions    tamsulosin 0.4 MG capsule   Commonly known as:  FLOMAX   Used for:  Incomplete bladder emptying   Started by:  Becca Rose PA-C        Dose:  0.4 mg   Take 1 capsule (0.4 mg) by mouth daily   Quantity:  30 capsule   Refills:  5            Where to get your medicines      These medications were sent to [id number] - [place], [place] - [address]  [address] [facility] [address]     Phone:  [phone]     tamsulosin 0.4 MG capsule                Primary Care Provider    Physician No Ref-Primary       No address on file        Equal Access to Services     PHILIP DONOVAN : Aixa Upton, wavance herron, qaybwin kaalmaalphonso bishop, binu thayer . So [facility] [phone].    ATENCIÓN: Si habla español, tiene a grider disposición servicios gratuitos de asistencia lingüística. Llame al [phone].    We comply with applicable federal civil rights laws and [place] laws. We do not discriminate on the basis of race, color, national origin, age, disability sex, sexual orientation or gender identity.            Thank you!     Thank you for choosing [facility]  for your care. Our goal is always to provide you with excellent care. Hearing back from our patients is one way we can continue to improve our services. Please take a few minutes to complete the written survey that you may receive in the mail after your visit with us. Thank you!             Your Updated Medication List - Protect others around you: Learn how to safely use, store and throw away your medicines at www.disposemymeds.org.          This list is accurate as of: 8/25/17 12:29 PM.  Always use your most recent med list.                   Brand Name Dispense Instructions for use Diagnosis    levETIRAcetam 500 MG tablet    KEPPRA    60 tablet     TAKE ONE TABLET BY MOUTH TWICE DAILY    Seizure disorder (H)       loperamide 2 MG tablet    IMODIUM A-D    20 tablet    Take 1 tablet (2 mg) by mouth 4 times daily as needed for diarrhea    Loose stools       loratadine 10 MG tablet    CLARITIN    30 tablet    Take 1 tablet (10 mg) by mouth daily    Seasonal allergic rhinitis due to fungal spores       OMEGA-3 FISH OIL PO      Take by mouth daily        ondansetron 4 MG tablet    ZOFRAN    18 tablet    Take 1 tablet (4 mg) by mouth every 8 hours as needed for nausea    Nausea       psyllium 0.52 G capsule     100 capsule    Take 1 capsule (0.52 g) by mouth At Bedtime    Irritable bowel syndrome with diarrhea       tamsulosin 0.4 MG capsule    FLOMAX    30 capsule    Take 1 capsule (0.4 mg) by mouth daily    Incomplete bladder emptying       terbinafine 250 MG tablet    lamISIL    14 tablet    Take 1 tablet (250 mg) by mouth daily        vitamin D 2000 UNITS tablet     100 tablet    Take 2,000 Units by mouth daily    Fatigue

## 2017-08-25 NOTE — PATIENT INSTRUCTIONS
UROLOGY CLINIC VISIT PATIENT INSTRUCTIONS    1) Work on cutting back on the sugar and caffeine.     Below is a list of foods that can irritate the bladder:      Caffeinated soft drinks.    Coffee.    Tea.    Chocolate.    Tomato-based foods.    Acidic juices and fruits.    Alcohol.    Carbonated drinks.    Aspartame/Nutrasweet.    2) Try the Flomax (tamsulosin) medication to see if this helps you empty your bladder better and thus have to go less often.       If you have any issues, questions or concerns in the meantime, do not hesitate to contact us at [phone] or via streamit.     It was a pleasure meeting with you today.  Thank you for allowing me and my team the privilege of caring for you today.  YOU are the reason we are here, and I truly hope we provided you with the excellent service you deserve.  Please let us know if there is anything else we can do for you so that we can be sure you are leaving completely satisfied with your care experience.

## 2017-12-15 DIAGNOSIS — G40.909 SEIZURE DISORDER (H): ICD-10-CM

## 2017-12-18 NOTE — TELEPHONE ENCOUNTER
Reason for Call:  Medication or medication refill: Refill    Do you use a [facility]?  Name of the pharmacy and phone number for the current request:  [facility]    Name of the medication requested: Keppra    Other request: NA    Can we leave a detailed message on this number? YES    Phone number patient can be reached at: Home number on file [phone] (home)    Best Time: ASAP - the patient is very upset and stated that he is calling his .    Call taken on 12/18/2017 at 1:01 PM by Luna Johnson

## 2017-12-19 RX ORDER — LEVETIRACETAM 500 MG/1
TABLET ORAL
Qty: 120 TABLET | Refills: 2 | Status: SHIPPED | OUTPATIENT
Start: 2017-12-19

## 2017-12-19 NOTE — TELEPHONE ENCOUNTER
Incoming call from pt checking on the status of refill. Pt states that he is out.     Melisa Fatima

## 2018-01-02 ENCOUNTER — TELEPHONE (OUTPATIENT)
Dept: FAMILY MEDICINE | Facility: CLINIC | Age: 33
End: 2018-01-02

## 2018-01-02 NOTE — TELEPHONE ENCOUNTER
Pt to office for Dr. De Paz.      Pt has been informed previously that he needs to change his insurance to be seen in this clinic.      Pt was insistent that he has never been told this.      Writer informed Pt that he cannot be seen today.    Pt stated that he needed his medication filled before he leave to go back to school on Saturday.    Writer offered Pt phone numbers to clinic that accept his insurance.  Pt declinied saying it's too late to get an Appt before Saturday.    Writer called [facility] and scheduled Pt an Appt at the Medicine clinic on Friday at 2 pm.    Pt will re-establish care at the Medicine clinic.      [address] [facility].  [address] [facility] [address] [facility].    Writer asked Pt if this would work and Pt decided that if would.  Pt informed of time and place of Appt and Pt accepted appt.    Nacho Snider RN

## 2018-12-12 ENCOUNTER — HOSPITAL ENCOUNTER (EMERGENCY)
Facility: CLINIC | Age: 33
Discharge: HOME | End: 2018-12-13
Attending: EMERGENCY MEDICINE | Admitting: EMERGENCY MEDICINE

## 2018-12-12 DIAGNOSIS — J18.9 PNEUMONIA OF LEFT LOWER LOBE DUE TO INFECTIOUS ORGANISM: ICD-10-CM

## 2018-12-12 PROCEDURE — 99284 EMERGENCY DEPT VISIT MOD MDM: CPT | Mod: Z6 | Performed by: EMERGENCY MEDICINE

## 2018-12-12 PROCEDURE — 99283 EMERGENCY DEPT VISIT LOW MDM: CPT | Mod: 25 | Performed by: EMERGENCY MEDICINE

## 2018-12-12 ASSESSMENT — MIFFLIN-ST. JEOR: SCORE: 1939.64

## 2018-12-12 NOTE — ED AVS SNAPSHOT
[facility], [place], Emergency Department  [address]  [facility] [place] [place] [address]  Phone:  [phone]  Fax:  [phone]                                    Harry Gonzales   MRN: [id number]    Department:  [facility], Emergency Department   Date of Visit:  12/12/2018           After Visit Summary Signature Page    I have received my discharge instructions, and my questions have been answered. I have discussed any challenges I see with this plan with the nurse or doctor.    ..........................................................................................................................................  Patient/Patient Representative Signature      ..........................................................................................................................................  Patient Representative Print Name and Relationship to Patient    ..................................................               ................................................  Date                                   Time    ..........................................................................................................................................  Reviewed by Signature/Title    ...................................................              ..............................................  Date                                               Time          22EPIC Rev 08/18

## 2018-12-13 ENCOUNTER — APPOINTMENT (OUTPATIENT)
Dept: GENERAL RADIOLOGY | Facility: CLINIC | Age: 33
End: 2018-12-13
Attending: EMERGENCY MEDICINE

## 2018-12-13 VITALS
SYSTOLIC BLOOD PRESSURE: 126 MMHG | OXYGEN SATURATION: 100 % | HEART RATE: 124 BPM | HEIGHT: 73 IN | DIASTOLIC BLOOD PRESSURE: 87 MMHG | WEIGHT: 207.4 LBS | TEMPERATURE: 97.7 F | BODY MASS INDEX: 27.49 KG/M2 | RESPIRATION RATE: 16 BRPM

## 2018-12-13 PROCEDURE — 71046 X-RAY EXAM CHEST 2 VIEWS: CPT

## 2018-12-13 RX ORDER — AZITHROMYCIN 250 MG/1
TABLET, FILM COATED ORAL
Qty: 6 TABLET | Refills: 0 | Status: SHIPPED | OUTPATIENT
Start: 2018-12-13 | End: 2018-12-18

## 2018-12-13 ASSESSMENT — ENCOUNTER SYMPTOMS
ANAL BLEEDING: 0
COUGH: 1
FEVER: 1
VOMITING: 0
SORE THROAT: 1
NAUSEA: 0
MYALGIAS: 0

## 2018-12-13 NOTE — DISCHARGE INSTRUCTIONS
Take antibiotic as directed.  Rest.  Drink plenty of fluids.  Follow up with your primary care clinic provider within 2 weeks.  Return if persistent, new, or worsening symptoms.

## 2018-12-13 NOTE — ED NOTES
Patient reported productive cough that started Sunday, subjective fever, body weakness. Today, patient complaining of nasal and chest congestion but denies shortness of breath

## 2018-12-13 NOTE — ED PROVIDER NOTES
History     Chief Complaint   Patient presents with     Cough     4 days PTC, patient experienced productive cough, reported subjective fever, congestion     HPI  Harry Gonzales is a 33 year old male with a history of seizures who presents to the Emergency Department for the evaluation of a cough. Patient complains of a subjective fever, productive cough, sore throat, and swollen lymph nodes since Monday, 12/10. Denies any abdominal pain, nausea, vomiting, body aches or ear pain. Patient has requested antibiotics multiple times.  No known infectious exposures. No chest pain or dyspnea.     I have reviewed the Medications, Allergies, Past Medical and Surgical History, and Social History in the [employer] system.  Past Medical History:   Diagnosis Date     Allergic state      Seizures (H)        History reviewed. No pertinent surgical history.    Family History   Problem Relation Age of Onset     Hypertension Father        Social History     Tobacco Use     Smoking status: Never Smoker     Smokeless tobacco: Never Used   Substance Use Topics     Alcohol use: No       No current facility-administered medications for this encounter.      Current Outpatient Medications   Medication     Cholecalciferol (VITAMIN D) 2000 UNITS tablet     levETIRAcetam (KEPPRA) 500 MG tablet     terbinafine (LAMISIL) 250 MG tablet        Allergies   Allergen Reactions     Nkda [No Known Drug Allergies]      Seasonal Allergies        Review of Systems   Constitutional: Positive for fever (subjective). Negative for activity change, chills and diaphoresis.   HENT: Positive for sore throat. Negative for congestion, ear pain, mouth sores, rhinorrhea, sinus pressure and sinus pain.    Eyes: Negative for pain and visual disturbance.   Respiratory: Positive for cough. Negative for chest tightness and shortness of breath.    Cardiovascular: Negative for chest pain, palpitations and leg swelling.   Gastrointestinal: Negative for anal bleeding, nausea and  "vomiting.   Genitourinary: Negative for dysuria and flank pain.   Musculoskeletal: Negative for back pain, myalgias, neck pain and neck stiffness.   Skin: Negative for rash.   Allergic/Immunologic: Negative for immunocompromised state.   Neurological: Negative for dizziness, syncope, weakness, light-headedness and headaches.   Hematological: Does not bruise/bleed easily.   Psychiatric/Behavioral: Negative for confusion.       Physical Exam   BP: 123/80  Pulse: 83  Temp: 97.7  F (36.5  C)  Resp: 18  Height: 185.4 cm (6' 1\")  Weight: 94.1 kg (207 lb 6.4 oz)  SpO2: 97 %      Physical Exam   Constitutional: He appears well-developed and well-nourished. No distress.   HENT:   Head: Normocephalic and atraumatic.   Right Ear: Tympanic membrane, external ear and ear canal normal.   Left Ear: Tympanic membrane, external ear and ear canal normal.   Nose: Nose normal.   Mouth/Throat: Uvula is midline, oropharynx is clear and moist and mucous membranes are normal. No oral lesions. No uvula swelling.   Eyes: Pupils are equal, round, and reactive to light. No scleral icterus.   Neck: Normal range of motion. Neck supple.   No nuchal rigidity. No meningeal signs.   Cardiovascular: Regular rhythm, normal heart sounds and intact distal pulses.   Pulmonary/Chest: Effort normal. No respiratory distress. He has rales.   Abdominal: Soft. Bowel sounds are normal. There is no tenderness.   Musculoskeletal: Normal range of motion. He exhibits no edema or tenderness.   Lymphadenopathy:     He has no cervical adenopathy.   Neurological: He is alert.   Skin: Skin is warm and dry. No rash noted. He is not diaphoretic.   Psychiatric: He has a normal mood and affect. His behavior is normal.       ED Course   12:09 AM  The patient was seen and examined by Jorge Rasmussen MD in Room 5.        Procedures             Critical Care time:  none             Labs Ordered and Resulted from Time of ED Arrival Up to the Time of Departure from the ED - No data " to display         Assessments & Plan (with Medical Decision Making)   Possible pneumonia based on exam. Atelectasis is an old finding on chest XR. No evidence of sepsis or hypoxia or respiratory failure. Will treat with azithromycin. Follow up in clinic. Return if persistent symptoms. He looks well. No history of immune compromise.    I have reviewed the nursing notes.    I have reviewed the findings, diagnosis, plan and need for follow up with the patient.       Medication List      ASK your doctor about these medications    azithromycin 250 MG tablet  Commonly known as:  ZITHROMAX  Two tablets on the first day, then one tablet daily for the next 4 days  Ask about: Should I take this medication?            Final diagnoses:   Pneumonia of left lower lobe due to infectious organism (H)     I, Gomez Stacy, am serving as a trained medical scribe to document services personally performed by Jorge Rasmussen MD, based on the provider's statements to me.   Jorge CLARK MD, was physically present and have reviewed and verified the accuracy of this note documented by Gomez Stacy.    12/12/2018   [facility], [place], EMERGENCY DEPARTMENT     Clarence Rasmussen MD  01/02/19 0140

## 2019-01-02 ASSESSMENT — ENCOUNTER SYMPTOMS
HEADACHES: 0
BRUISES/BLEEDS EASILY: 0
SHORTNESS OF BREATH: 0
NECK STIFFNESS: 0
FLANK PAIN: 0
CHEST TIGHTNESS: 0
SINUS PRESSURE: 0
SINUS PAIN: 0
LIGHT-HEADEDNESS: 0
BACK PAIN: 0
NECK PAIN: 0
CONFUSION: 0
DIAPHORESIS: 0
DYSURIA: 0
RHINORRHEA: 0
ACTIVITY CHANGE: 0
WEAKNESS: 0
CHILLS: 0
EYE PAIN: 0
DIZZINESS: 0
PALPITATIONS: 0

## 2019-04-18 ENCOUNTER — HOSPITAL ENCOUNTER (EMERGENCY)
Facility: CLINIC | Age: 34
Discharge: HOME | End: 2019-04-19
Attending: EMERGENCY MEDICINE | Admitting: EMERGENCY MEDICINE
Payer: COMMERCIAL

## 2019-04-18 DIAGNOSIS — R35.0 URINARY FREQUENCY: ICD-10-CM

## 2019-04-18 LAB
ALBUMIN UR-MCNC: NEGATIVE MG/DL
APPEARANCE UR: CLEAR
BILIRUB UR QL STRIP: NEGATIVE
COLOR UR AUTO: ABNORMAL
GLUCOSE UR STRIP-MCNC: NEGATIVE MG/DL
HGB UR QL STRIP: NEGATIVE
KETONES UR STRIP-MCNC: NEGATIVE MG/DL
LEUKOCYTE ESTERASE UR QL STRIP: NEGATIVE
MUCOUS THREADS #/AREA URNS LPF: PRESENT /LPF
NITRATE UR QL: NEGATIVE
PH UR STRIP: 6.5 PH (ref 5–7)
RBC #/AREA URNS AUTO: <1 /HPF (ref 0–2)
SOURCE: ABNORMAL
SP GR UR STRIP: 1.01 (ref 1–1.03)
UROBILINOGEN UR STRIP-MCNC: NORMAL MG/DL (ref 0–2)
WBC #/AREA URNS AUTO: <1 /HPF (ref 0–5)

## 2019-04-18 PROCEDURE — 99283 EMERGENCY DEPT VISIT LOW MDM: CPT | Mod: Z6 | Performed by: EMERGENCY MEDICINE

## 2019-04-18 PROCEDURE — 81001 URINALYSIS AUTO W/SCOPE: CPT | Performed by: EMERGENCY MEDICINE

## 2019-04-18 PROCEDURE — 99283 EMERGENCY DEPT VISIT LOW MDM: CPT | Performed by: EMERGENCY MEDICINE

## 2019-04-18 ASSESSMENT — ENCOUNTER SYMPTOMS
CONSTIPATION: 0
NAUSEA: 0
FEVER: 0
VOMITING: 0
DIFFICULTY URINATING: 1
DIARRHEA: 0
HEMATURIA: 0
ABDOMINAL PAIN: 1

## 2019-04-18 NOTE — ED AVS SNAPSHOT
[facility], [place], Emergency Department  [address] [place] [address]  Phone:  [phone]  Fax:  [phone]                                    Lane Gonzales   MRN: [id number]    Department:  [facility], Emergency Department   Date of Visit:  4/18/2019           After Visit Summary Signature Page    I have received my discharge instructions, and my questions have been answered. I have discussed any challenges I see with this plan with the nurse or doctor.    ..........................................................................................................................................  Patient/Patient Representative Signature      ..........................................................................................................................................  Patient Representative Print Name and Relationship to Patient    ..................................................               ................................................  Date                                   Time    ..........................................................................................................................................  Reviewed by Signature/Title    ...................................................              ..............................................  Date                                               Time          22EPIC Rev 08/18

## 2019-04-19 VITALS
TEMPERATURE: 97.6 F | RESPIRATION RATE: 16 BRPM | SYSTOLIC BLOOD PRESSURE: 136 MMHG | BODY MASS INDEX: 27.57 KG/M2 | OXYGEN SATURATION: 100 % | HEART RATE: 73 BPM | WEIGHT: 209 LBS | DIASTOLIC BLOOD PRESSURE: 88 MMHG

## 2019-04-19 RX ORDER — TAMSULOSIN HYDROCHLORIDE 0.4 MG/1
0.4 CAPSULE ORAL DAILY
Qty: 30 CAPSULE | Refills: 0 | Status: SHIPPED | OUTPATIENT
Start: 2019-04-19

## 2019-04-19 ASSESSMENT — ENCOUNTER SYMPTOMS
DYSURIA: 0
SORE THROAT: 0
FREQUENCY: 1
SHORTNESS OF BREATH: 0
COUGH: 0
PALPITATIONS: 0
WHEEZING: 0

## 2019-04-19 NOTE — DISCHARGE INSTRUCTIONS
You have been seen in the ER today for difficulty with emptying the bladder.  Your urine sample does not show any sign of an infection.  Your bladder scan does not show any significant urinary retention.  It is possible that you may have some early BPH that we are not yet able to detect on physical exam. We have given you a prescription for flomax to try to see if this is helpful for your urinary symptoms.  You should definitely make arrangements to follow up in your clinic before your prescription runs out so they can make arrangements for refills for you. You can also call to make an appointment at a urology clinic if you are noticing ongoing symptoms which are concerning to you or if you have questions for a specialist.    Please make an appointment to follow up with Urology Clinic (phone: [phone]).

## 2019-04-25 ENCOUNTER — HOSPITAL ENCOUNTER (EMERGENCY)
Facility: CLINIC | Age: 34
Discharge: HOME | End: 2019-04-25
Attending: EMERGENCY MEDICINE | Admitting: EMERGENCY MEDICINE
Payer: COMMERCIAL

## 2019-04-25 ENCOUNTER — APPOINTMENT (OUTPATIENT)
Dept: GENERAL RADIOLOGY | Facility: CLINIC | Age: 34
End: 2019-04-25
Attending: EMERGENCY MEDICINE
Payer: COMMERCIAL

## 2019-04-25 VITALS
OXYGEN SATURATION: 96 % | RESPIRATION RATE: 16 BRPM | WEIGHT: 206.6 LBS | HEART RATE: 95 BPM | SYSTOLIC BLOOD PRESSURE: 115 MMHG | DIASTOLIC BLOOD PRESSURE: 72 MMHG | BODY MASS INDEX: 27.26 KG/M2 | TEMPERATURE: 97.7 F

## 2019-04-25 DIAGNOSIS — J06.9 VIRAL UPPER RESPIRATORY TRACT INFECTION: ICD-10-CM

## 2019-04-25 DIAGNOSIS — R05.9 COUGH: ICD-10-CM

## 2019-04-25 PROCEDURE — 25000131 ZZH RX MED GY IP 250 OP 636 PS 637: Performed by: EMERGENCY MEDICINE

## 2019-04-25 PROCEDURE — 99284 EMERGENCY DEPT VISIT MOD MDM: CPT | Mod: Z6 | Performed by: EMERGENCY MEDICINE

## 2019-04-25 PROCEDURE — 71046 X-RAY EXAM CHEST 2 VIEWS: CPT

## 2019-04-25 PROCEDURE — 99283 EMERGENCY DEPT VISIT LOW MDM: CPT | Mod: 25 | Performed by: EMERGENCY MEDICINE

## 2019-04-25 RX ORDER — ONDANSETRON 4 MG/1
4 TABLET, ORALLY DISINTEGRATING ORAL ONCE
Status: COMPLETED | OUTPATIENT
Start: 2019-04-25 | End: 2019-04-25

## 2019-04-25 RX ORDER — ONDANSETRON 4 MG/1
4 TABLET, FILM COATED ORAL EVERY 8 HOURS PRN
Qty: 6 TABLET | Refills: 0 | Status: SHIPPED | OUTPATIENT
Start: 2019-04-25

## 2019-04-25 RX ORDER — IBUPROFEN 200 MG
400 TABLET ORAL EVERY 8 HOURS PRN
Qty: 60 TABLET | Refills: 0 | Status: SHIPPED | OUTPATIENT
Start: 2019-04-25

## 2019-04-25 RX ADMIN — ONDANSETRON 4 MG: 4 TABLET, ORALLY DISINTEGRATING ORAL at 11:06

## 2019-04-25 ASSESSMENT — ENCOUNTER SYMPTOMS
DIARRHEA: 0
FEVER: 0
CHILLS: 1
COUGH: 1
SHORTNESS OF BREATH: 1
ABDOMINAL PAIN: 1
NAUSEA: 1
VOMITING: 1
RHINORRHEA: 1

## 2019-04-25 NOTE — ED AVS SNAPSHOT
[facility], [place], Emergency Department  [address] [place] [address]  Phone:  [phone]  Fax:  [phone]                                    Lane Gonzales   MRN: [id number]    Department:  [facility], Emergency Department   Date of Visit:  4/25/2019           After Visit Summary Signature Page    I have received my discharge instructions, and my questions have been answered. I have discussed any challenges I see with this plan with the nurse or doctor.    ..........................................................................................................................................  Patient/Patient Representative Signature      ..........................................................................................................................................  Patient Representative Print Name and Relationship to Patient    ..................................................               ................................................  Date                                   Time    ..........................................................................................................................................  Reviewed by Signature/Title    ...................................................              ..............................................  Date                                               Time          22EPIC Rev 08/18

## 2019-04-25 NOTE — ED PROVIDER NOTES
[facility] EMERGENCY DEPARTMENT ([facility])    4/25/19        History     Chief Complaint   Patient presents with     Cough     The history is provided by the patient and medical records.     Lane Gonzales is a 34 year old male with a past medical history significant for seizures and allergic rhinitis who presents to the Emergency Department due to a cough and chills. Patient reports that he has had his symptoms for around 1 week. Per chart review patient was seen here in the ED on 4/18/2019 due to difficulty emptying bladder, chills and malaise. Patient believed that he had a UTI. UA was done and was without signs of infection. Patient was told to follow up with urology and he is currently on Flomax.    Today patient believes that he has pneumonia due to his symptoms lasting for a week. He has been complaining of a cough with green sputum, chills, malaise, and one episode of shoulder pain that resolved with message. Patient states his symptoms got worse yesterday.  Earlier this morning around 6 AM patient had one instance of vomiting and shortness of breath. He denies any fevers, change in appetite, leg swelling or diarrhea. 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 He denies previous DVT's or PE's. He is a nonsmoker. Denies sick contacts. He has been taking Ibuprofen for his symptoms. States that his symptoms are different than his seasonal allergies due to the length.    I have reviewed the Medications, Allergies, Past Medical and Surgical History, and Social History in the [employer] system.    Past Medical History:   Diagnosis Date     Allergic state      Seizures (H)        History reviewed. No pertinent surgical history.    Family History   Problem Relation Age of Onset     Hypertension Father        Social History     Tobacco Use     Smoking status: Never Smoker     Smokeless tobacco: Never Used   Substance Use Topics     Alcohol use: No       No current  facility-administered medications for this encounter.      Current Outpatient Medications   Medication     levETIRAcetam (KEPPRA) 500 MG tablet     ondansetron (ZOFRAN) 4 MG tablet     tamsulosin (FLOMAX) 0.4 MG capsule     Cholecalciferol (VITAMIN D) 2000 UNITS tablet        Allergies   Allergen Reactions     Nkda [No Known Drug Allergies]      Seasonal Allergies          Review of Systems   Constitutional: Positive for chills. Negative for fever.   HENT: Positive for congestion and rhinorrhea.    Respiratory: Positive for cough and shortness of breath.    Cardiovascular: Negative for chest pain and leg swelling.   Gastrointestinal: Positive for abdominal pain (mild), nausea and vomiting. Negative for diarrhea.   Allergic/Immunologic: Negative for immunocompromised state.   All other systems reviewed and are negative.      Physical Exam   BP: 115/72  Pulse: 95  Temp: 97.7  F (36.5  C)  Resp: 16  Weight: 93.7 kg (206 lb 9.6 oz)  SpO2: 96 %      Physical Exam   Constitutional: He is oriented to person, place, and time. He appears well-developed and well-nourished. No distress.   HENT:   Head: Normocephalic and atraumatic.   Nose: Nose normal.   Mouth/Throat: Oropharynx is clear and moist.   Eyes: Conjunctivae are normal. No scleral icterus.   Neck: Normal range of motion. Neck supple.   Cardiovascular: Normal rate, regular rhythm, normal heart sounds and intact distal pulses.   No murmur heard.  Pulmonary/Chest: Effort normal and breath sounds normal. No stridor. No respiratory distress. He has no wheezes. He has no rales.   Abdominal: Soft. Bowel sounds are normal. He exhibits no distension. There is no tenderness. There is no rebound and no guarding.   Musculoskeletal: Normal range of motion. He exhibits no edema, tenderness or deformity.   Neurological: He is alert and oriented to person, place, and time. He exhibits normal muscle tone.   Skin: Skin is warm and dry. No rash noted. He is not diaphoretic. No pallor.    Psychiatric: He has a normal mood and affect. His behavior is normal. Thought content normal.   Nursing note and vitals reviewed.      ED Course   10:44 AM  The patient was seen and examined by Joyce Sanchez MD in Room ED04.        Procedures             Critical Care time:  none             Labs Ordered and Resulted from Time of ED Arrival Up to the Time of Departure from the ED - No data to display        Chest XR,  PA & LAT   Final Result   IMPRESSION: No acute cardiopulmonary abnormality.      MINOR BURNS MD            Assessments & Plan (with Medical Decision Making)   Lane Gonzales is a 34 year old male with a past medical history significant for seizures who presents to the Emergency Department due to a cough and chills.    Ddx: viral URI, PNA, allergic rhinitis    Patient requesting xray b/c he thinks he has pna. Otherwise healthy w/o cardiac risk factors. PERC neg. No chest pain or pleurisy currently. No indication for labs. Given zofran and PO water for hydration.     Patient tired and when chest xray/registration entered room, he requested he not be interrupted so he can sleep. I spoke with him and he denies homelessness. Did not sleep well last night. Not lethargic on exam.     Xray neg. Vitals wnl. No recurrent vomiting in ED. Lungs clear. Reassured patient. Advised PCP f/u in 3-4 days if not improved. Given rx zofran for po hydration. Return precautions provided.       I have reviewed the nursing notes.    I have reviewed the findings, diagnosis, plan and need for follow up with the patient.       Medication List      Started    ondansetron 4 MG tablet  Commonly known as:  ZOFRAN  4 mg, Oral, EVERY 8 HOURS PRN            Final diagnoses:   Cough   URI (upper respiratory infection)     IMynor, am serving as a trained medical scribe to document services personally performed by Joyce Sanchez MD, based on the provider's statements to me.   IJoyce MD, was physically present and have  reviewed and verified the accuracy of this note documented by Mynor Alanis.    4/25/2019   [facility], [place], EMERGENCY DEPARTMENT     Joyce Sanchez MD  04/25/19 1146

## 2019-04-25 NOTE — ED NOTES
Pt. Presents to the ED with complaints of a cough that started just shy of a week ago. Pt. States yesterday his cough has got much worse. Complains of chest pain with his cough, states he feels chills/gets warm, but no fever. Congestion, with clear/white mucous with a tad yellowish presentation to it. States he vomited one time this morning, which was a lot. Pt does also state he feels short of breath as well.

## 2019-04-25 NOTE — ED NOTES
"This writer went into the pt's room to give him Zofran and to let him know that transport would be in shortly to bring him to xray for a chest xray. Pt's comment was \"can we I go to xray later, so I can sleep\". Writer reminded the pt that we are in an ED, where we figure out of the problem and treat the patient, who then in hopes can go home and rest.   "

## 2019-04-25 NOTE — DISCHARGE INSTRUCTIONS
Please make an appointment to [facility] (phone: [phone] in 3-4 days unless symptoms completely resolve.    Drink plenty of fluids. Take ibuprofen or tylenol as needed for pain for fever.     Return to the emergency department for worsening symptoms such as chest pain, shortness or breath, fever, coughing up blood, fainting, or dehydration.     Take zofran for nausea, as needed.

## 2021-04-16 ENCOUNTER — HOSPITAL ENCOUNTER (EMERGENCY)
Facility: CLINIC | Age: 36
Discharge: HOME | End: 2021-04-16
Attending: EMERGENCY MEDICINE | Admitting: EMERGENCY MEDICINE
Payer: COMMERCIAL

## 2021-04-16 ENCOUNTER — APPOINTMENT (OUTPATIENT)
Dept: GENERAL RADIOLOGY | Facility: CLINIC | Age: 36
End: 2021-04-16
Attending: EMERGENCY MEDICINE
Payer: COMMERCIAL

## 2021-04-16 VITALS
WEIGHT: 238 LBS | BODY MASS INDEX: 31.4 KG/M2 | OXYGEN SATURATION: 97 % | HEART RATE: 98 BPM | TEMPERATURE: 98.7 F | SYSTOLIC BLOOD PRESSURE: 160 MMHG | DIASTOLIC BLOOD PRESSURE: 101 MMHG | RESPIRATION RATE: 16 BRPM

## 2021-04-16 DIAGNOSIS — U07.1 2019 NOVEL CORONAVIRUS DISEASE (COVID-19): ICD-10-CM

## 2021-04-16 LAB
FLUAV RNA RESP QL NAA+PROBE: NEGATIVE
FLUBV RNA RESP QL NAA+PROBE: NEGATIVE
LABORATORY COMMENT REPORT: ABNORMAL
RSV RNA SPEC QL NAA+PROBE: ABNORMAL
SARS-COV-2 RNA RESP QL NAA+PROBE: POSITIVE
SPECIMEN SOURCE: ABNORMAL

## 2021-04-16 PROCEDURE — C9803 HOPD COVID-19 SPEC COLLECT: HCPCS | Performed by: EMERGENCY MEDICINE

## 2021-04-16 PROCEDURE — 71045 X-RAY EXAM CHEST 1 VIEW: CPT

## 2021-04-16 PROCEDURE — 99284 EMERGENCY DEPT VISIT MOD MDM: CPT | Performed by: EMERGENCY MEDICINE

## 2021-04-16 PROCEDURE — 87636 SARSCOV2 & INF A&B AMP PRB: CPT | Performed by: EMERGENCY MEDICINE

## 2021-04-16 PROCEDURE — 99284 EMERGENCY DEPT VISIT MOD MDM: CPT | Mod: 25 | Performed by: EMERGENCY MEDICINE

## 2021-04-16 ASSESSMENT — ENCOUNTER SYMPTOMS
SORE THROAT: 1
DIARRHEA: 0
APPETITE CHANGE: 0
HEADACHES: 1
CHILLS: 1
NAUSEA: 1
ABDOMINAL PAIN: 0
MYALGIAS: 1
FEVER: 1
COUGH: 1
VOMITING: 0

## 2021-04-16 NOTE — ED PROVIDER NOTES
ED Provider Note  [facility]      History     Chief Complaint   Patient presents with     Cough     yellow sputum, has had hx of PNA in the past, was seen recently at urgent care and told he does not have PNA. Pt concerned that he is reacting to [employer] vaccine, has been having chills, body aches     The history is provided by the patient.     Lane Gonzales is a 36-year-old man, currently a medical student, presents to the ED with a complaint of cough, myalgias, headache, and a little bit of sore throat.  He states that he has been sick for just under 2 weeks.  No loss of sense of taste or smell but he states that he has not been able to taste right lately.  He reports he has had some yellow sputum production.  He did notes some subjective fevers and chills the last couple of days.  He did get a [employer] Covid vaccine 6 days ago.  He denies any vomiting or diarrhea but has had a little bit of nausea the last few days.  He states he was seen in urgent care and was told he did not have pneumonia over a week ago, but now feels somewhat worse and wanted to get checked again.  He admits he is under a lot of stress as he is studying for step one of his board exams.  He states he has had a previous history of pneumonia which cleared up with azithromycin.  No significant abdominal pain.  No other specific complaints.  This part of the medical record was transcribed by Anali Graff Medical Scribe, from a dictation done by Leena Motley MD.    Past Medical History  Past Medical History:   Diagnosis Date     Allergic state      Seizures (H)      No past surgical history on file.  ibuprofen (ADVIL/MOTRIN) 200 MG tablet  levETIRAcetam (KEPPRA) 500 MG tablet  tamsulosin (FLOMAX) 0.4 MG capsule  Cholecalciferol (VITAMIN D) 2000 UNITS tablet  ondansetron (ZOFRAN) 4 MG tablet      Allergies   Allergen Reactions     Nkda [No Known Drug Allergies]      Seasonal Allergies      Family  History  Family History   Problem Relation Age of Onset     Hypertension Father      Social History   Social History     Tobacco Use     Smoking status: Never Smoker     Smokeless tobacco: Never Used   Substance Use Topics     Alcohol use: No     Drug use: No      Past medical history, past surgical history, medications, allergies, family history, and social history were reviewed with the patient. No additional pertinent items.       Review of Systems   Constitutional: Positive for chills and fever (subjective). Negative for appetite change.   HENT: Positive for sore throat.    Respiratory: Positive for cough (yellow sputum production).    Gastrointestinal: Positive for nausea. Negative for abdominal pain, diarrhea and vomiting.   Musculoskeletal: Positive for myalgias.   Neurological: Positive for headaches.   All other systems reviewed and are negative.    A complete review of systems was performed with pertinent positives and negatives noted in the HPI, and all other systems negative.    Physical Exam   BP: (!) 160/101  Pulse: 98  Temp: 98.7  F (37.1  C)  Resp: 16  Weight: 108 kg (238 lb)  SpO2: 97 %  Physical Exam  Constitutional:       General: He is not in acute distress.     Appearance: He is not diaphoretic.   HENT:      Head: Atraumatic.   Eyes:      General: No scleral icterus.  Cardiovascular:      Heart sounds: Normal heart sounds.   Pulmonary:      Effort: No respiratory distress.      Breath sounds: Normal breath sounds.   Abdominal:      Palpations: Abdomen is soft.      Tenderness: There is no abdominal tenderness.   Musculoskeletal:         General: No tenderness.   Skin:     General: Skin is warm.      Findings: No rash ().         ED Course      Procedures               Results for orders placed or performed during the hospital encounter of 04/16/21   XR Chest Port 1 View     Status: None    Narrative    EXAM: XR CHEST PORT 1 VW  LOCATION: [facility]  DATE/TIME: 4/16/2021 1:23  AM    INDICATION: Cough  COMPARISON: 4/25/2019      Impression    IMPRESSION: No change. Linear scar present within right upper lobe, lungs otherwise clear. Heart size normal.   Symptomatic Influenza A/B & SARS-CoV2 (COVID-19) Virus PCR Multiplex     Status: Abnormal    Specimen: Nasopharyngeal   Result Value Ref Range    Flu A/B & SARS-COV-2 PCR Source Nasopharyngeal     SARS-CoV-2 PCR Result POSITIVE (AA)     Influenza A PCR Negative NEG^Negative    Influenza B PCR Negative NEG^Negative    Respiratory Syncytial Virus PCR (Note)     Flu A/B & SARS-CoV-2 PCR Comment (Note)      Medications - No data to display     Assessments & Plan (with Medical Decision Making)   Patient has normal O2 saturations.  No concerning findings on exam.  Chest x-ray was unremarkable.  Covid test did come back positive.  He was symptomatic prior to getting his Covid vaccine.  I did answer all of his questions.  I discussed quarantine guidelines and indications for return.  I did stress with him the importance of quarantine.  He is specifically instructed to return if he develops any shortness of breath or any significant worsening of his symptoms.  He may use Tylenol or ibuprofen as needed for fever, but understands he needs to be fever free without any fever reducing medications for 3 days prior to breaking his quarantine.  He has been symptomatic for greater than 10 days.  The patient verbalizes understanding and is agreeable to the plan.    Dictation Disclaimer: Some of this Note has been completed with voice-recognition dictation software. Although errors are generally corrected real-time, there is the potential for a rare error to be present in the completed chart.      I have reviewed the nursing notes. I have reviewed the findings, diagnosis, plan and need for follow up with the patient.    Discharge Medication List as of 4/16/2021  2:14 AM          Final diagnoses:   2019 novel coronavirus disease (COVID-19)       --    eLena  Edilma Motley MD  [facility] EMERGENCY DEPARTMENT  4/16/2021     Leena Motley MD  04/16/21 0451

## 2021-04-16 NOTE — ED NOTES
Pt used bathroom after being told by primary RN not to use department bathroom.  Pt walking in hallway without mask, Pt told numerous times to put mask on.  Pt told he is a PUI and is putting everyone at risk.

## 2021-04-16 NOTE — DISCHARGE INSTRUCTIONS
You need to quarantine yourself until at least 3 days after you start to feel your symptoms are improving AND at least 3 days of no fever WITHOUT the use of fever reducing medications. Return with any shortness of breath or concerns. You may use tylenol or ibuprofen as needed for comfort.

## 2021-04-16 NOTE — ED NOTES
"  Pt took his mask off and started coughing on writer while triage was being done.  Pt very anxious in triage, kept repeating questions pt redirected.  When pt was placed in the room, explained that he needs to stay in the room due to his coughing. PUI policy discussed with pt at length.  Pt then started stuffing his used burf bag in between the bed, redirected.  Pt then got upset with writer \" watch your tone of voice\" further stated that he is not being taken cared of.  Writer apologized that he is unhappy with his care, pt was offered phone number of pt relations phone number. Pt refused.  Primary RN updated on pt's concerns on his cares  "

## 2021-04-21 ENCOUNTER — HOSPITAL ENCOUNTER (EMERGENCY)
Facility: CLINIC | Age: 36
Discharge: HOME | End: 2021-04-22
Attending: FAMILY MEDICINE | Admitting: FAMILY MEDICINE
Payer: COMMERCIAL

## 2021-04-21 ENCOUNTER — APPOINTMENT (OUTPATIENT)
Dept: GENERAL RADIOLOGY | Facility: CLINIC | Age: 36
End: 2021-04-21
Attending: FAMILY MEDICINE
Payer: COMMERCIAL

## 2021-04-21 DIAGNOSIS — R11.2 NAUSEA AND VOMITING, INTRACTABILITY OF VOMITING NOT SPECIFIED, UNSPECIFIED VOMITING TYPE: ICD-10-CM

## 2021-04-21 DIAGNOSIS — E86.0 DEHYDRATION: ICD-10-CM

## 2021-04-21 DIAGNOSIS — U07.1 2019 NOVEL CORONAVIRUS DISEASE (COVID-19): ICD-10-CM

## 2021-04-21 LAB
ALBUMIN SERPL-MCNC: 3.6 G/DL (ref 3.4–5)
ALP SERPL-CCNC: 89 U/L (ref 40–150)
ALT SERPL W P-5'-P-CCNC: 50 U/L (ref 0–70)
ANION GAP SERPL CALCULATED.3IONS-SCNC: 7 MMOL/L (ref 3–14)
APTT PPP: 31 SEC (ref 22–37)
AST SERPL W P-5'-P-CCNC: 44 U/L (ref 0–45)
BASOPHILS # BLD AUTO: 0 10E9/L (ref 0–0.2)
BASOPHILS NFR BLD AUTO: 0.2 %
BILIRUB SERPL-MCNC: 0.3 MG/DL (ref 0.2–1.3)
BUN SERPL-MCNC: 9 MG/DL (ref 7–30)
CALCIUM SERPL-MCNC: 8.2 MG/DL (ref 8.5–10.1)
CHLORIDE SERPL-SCNC: 107 MMOL/L (ref 94–109)
CO2 SERPL-SCNC: 24 MMOL/L (ref 20–32)
CREAT SERPL-MCNC: 0.72 MG/DL (ref 0.66–1.25)
CRP SERPL-MCNC: 20 MG/L (ref 0–8)
DIFFERENTIAL METHOD BLD: NORMAL
EOSINOPHIL # BLD AUTO: 0 10E9/L (ref 0–0.7)
EOSINOPHIL NFR BLD AUTO: 0 %
ERYTHROCYTE [DISTWIDTH] IN BLOOD BY AUTOMATED COUNT: 13.1 % (ref 10–15)
GFR SERPL CREATININE-BSD FRML MDRD: >90 ML/MIN/{1.73_M2}
GLUCOSE SERPL-MCNC: 94 MG/DL (ref 70–99)
HCT VFR BLD AUTO: 47.1 % (ref 40–53)
HGB BLD-MCNC: 15.9 G/DL (ref 13.3–17.7)
IMM GRANULOCYTES # BLD: 0 10E9/L (ref 0–0.4)
IMM GRANULOCYTES NFR BLD: 0.2 %
INR PPP: 1.04 (ref 0.86–1.14)
INTERPRETATION ECG - MUSE: NORMAL
LIPASE SERPL-CCNC: 198 U/L (ref 73–393)
LYMPHOCYTES # BLD AUTO: 1 10E9/L (ref 0.8–5.3)
LYMPHOCYTES NFR BLD AUTO: 22.7 %
MCH RBC QN AUTO: 30.5 PG (ref 26.5–33)
MCHC RBC AUTO-ENTMCNC: 33.8 G/DL (ref 31.5–36.5)
MCV RBC AUTO: 90 FL (ref 78–100)
MONOCYTES # BLD AUTO: 0.3 10E9/L (ref 0–1.3)
MONOCYTES NFR BLD AUTO: 7.7 %
NEUTROPHILS # BLD AUTO: 3.1 10E9/L (ref 1.6–8.3)
NEUTROPHILS NFR BLD AUTO: 69.2 %
NRBC # BLD AUTO: 0 10*3/UL
NRBC BLD AUTO-RTO: 0 /100
NT-PROBNP SERPL-MCNC: 17 PG/ML (ref 0–450)
PLATELET # BLD AUTO: 228 10E9/L (ref 150–450)
POTASSIUM SERPL-SCNC: 3.8 MMOL/L (ref 3.4–5.3)
PROT SERPL-MCNC: 8 G/DL (ref 6.8–8.8)
RBC # BLD AUTO: 5.21 10E12/L (ref 4.4–5.9)
SODIUM SERPL-SCNC: 138 MMOL/L (ref 133–144)
TROPONIN I SERPL-MCNC: <0.015 UG/L (ref 0–0.04)
WBC # BLD AUTO: 4.4 10E9/L (ref 4–11)

## 2021-04-21 PROCEDURE — 85610 PROTHROMBIN TIME: CPT | Performed by: FAMILY MEDICINE

## 2021-04-21 PROCEDURE — 84484 ASSAY OF TROPONIN QUANT: CPT | Performed by: FAMILY MEDICINE

## 2021-04-21 PROCEDURE — 80053 COMPREHEN METABOLIC PANEL: CPT | Performed by: FAMILY MEDICINE

## 2021-04-21 PROCEDURE — 93010 ELECTROCARDIOGRAM REPORT: CPT | Performed by: FAMILY MEDICINE

## 2021-04-21 PROCEDURE — 85730 THROMBOPLASTIN TIME PARTIAL: CPT | Performed by: FAMILY MEDICINE

## 2021-04-21 PROCEDURE — 83880 ASSAY OF NATRIURETIC PEPTIDE: CPT | Performed by: FAMILY MEDICINE

## 2021-04-21 PROCEDURE — 71045 X-RAY EXAM CHEST 1 VIEW: CPT

## 2021-04-21 PROCEDURE — 93005 ELECTROCARDIOGRAM TRACING: CPT

## 2021-04-21 PROCEDURE — 85025 COMPLETE CBC W/AUTO DIFF WBC: CPT | Performed by: FAMILY MEDICINE

## 2021-04-21 PROCEDURE — 83690 ASSAY OF LIPASE: CPT | Performed by: FAMILY MEDICINE

## 2021-04-21 PROCEDURE — 99285 EMERGENCY DEPT VISIT HI MDM: CPT | Mod: 25

## 2021-04-21 PROCEDURE — 94640 AIRWAY INHALATION TREATMENT: CPT

## 2021-04-21 PROCEDURE — 99285 EMERGENCY DEPT VISIT HI MDM: CPT | Mod: 25 | Performed by: FAMILY MEDICINE

## 2021-04-21 PROCEDURE — 96374 THER/PROPH/DIAG INJ IV PUSH: CPT

## 2021-04-21 PROCEDURE — 250N000011 HC RX IP 250 OP 636: Performed by: FAMILY MEDICINE

## 2021-04-21 PROCEDURE — 258N000003 HC RX IP 258 OP 636: Performed by: FAMILY MEDICINE

## 2021-04-21 PROCEDURE — 86140 C-REACTIVE PROTEIN: CPT | Performed by: FAMILY MEDICINE

## 2021-04-21 PROCEDURE — 250N000013 HC RX MED GY IP 250 OP 250 PS 637: Performed by: FAMILY MEDICINE

## 2021-04-21 PROCEDURE — 96361 HYDRATE IV INFUSION ADD-ON: CPT

## 2021-04-21 RX ORDER — SODIUM CHLORIDE 9 MG/ML
INJECTION, SOLUTION INTRAVENOUS CONTINUOUS
Status: DISCONTINUED | OUTPATIENT
Start: 2021-04-21 | End: 2021-04-22 | Stop reason: HOSPADM

## 2021-04-21 RX ORDER — LIDOCAINE 40 MG/G
CREAM TOPICAL
Status: DISCONTINUED | OUTPATIENT
Start: 2021-04-21 | End: 2021-04-22 | Stop reason: HOSPADM

## 2021-04-21 RX ORDER — ONDANSETRON 2 MG/ML
4 INJECTION INTRAMUSCULAR; INTRAVENOUS EVERY 30 MIN PRN
Status: DISCONTINUED | OUTPATIENT
Start: 2021-04-21 | End: 2021-04-22 | Stop reason: HOSPADM

## 2021-04-21 RX ORDER — ONDANSETRON 4 MG/1
4 TABLET, ORALLY DISINTEGRATING ORAL EVERY 8 HOURS PRN
Qty: 12 TABLET | Refills: 0 | Status: SHIPPED | OUTPATIENT
Start: 2021-04-21 | End: 2021-04-24

## 2021-04-21 RX ORDER — ALBUTEROL SULFATE 90 UG/1
2 AEROSOL, METERED RESPIRATORY (INHALATION) ONCE
Status: COMPLETED | OUTPATIENT
Start: 2021-04-21 | End: 2021-04-21

## 2021-04-21 RX ADMIN — SODIUM CHLORIDE: 9 INJECTION, SOLUTION INTRAVENOUS at 22:01

## 2021-04-21 RX ADMIN — SODIUM CHLORIDE 1000 ML: 9 INJECTION, SOLUTION INTRAVENOUS at 20:47

## 2021-04-21 RX ADMIN — ALBUTEROL SULFATE 2 PUFF: 90 AEROSOL, METERED RESPIRATORY (INHALATION) at 22:29

## 2021-04-21 RX ADMIN — ONDANSETRON 4 MG: 2 INJECTION INTRAMUSCULAR; INTRAVENOUS at 20:47

## 2021-04-21 ASSESSMENT — ENCOUNTER SYMPTOMS
SHORTNESS OF BREATH: 0
LIGHT-HEADEDNESS: 1
VOMITING: 1
APPETITE CHANGE: 1
NAUSEA: 1
DIARRHEA: 1
COUGH: 1

## 2021-04-22 VITALS
DIASTOLIC BLOOD PRESSURE: 96 MMHG | RESPIRATION RATE: 18 BRPM | TEMPERATURE: 98.7 F | SYSTOLIC BLOOD PRESSURE: 132 MMHG | OXYGEN SATURATION: 92 % | HEART RATE: 89 BPM

## 2021-04-22 ASSESSMENT — ENCOUNTER SYMPTOMS
NECK PAIN: 0
BRUISES/BLEEDS EASILY: 0
CONFUSION: 0
FATIGUE: 1
SORE THROAT: 0
DECREASED CONCENTRATION: 0
NERVOUS/ANXIOUS: 1
DYSURIA: 0
TROUBLE SWALLOWING: 0
BACK PAIN: 0
WHEEZING: 0
DYSPHORIC MOOD: 0
CHOKING: 0
ABDOMINAL PAIN: 0
WEAKNESS: 1
FEVER: 0
ACTIVITY CHANGE: 1

## 2021-04-22 NOTE — ED PROVIDER NOTES
[facility] EMERGENCY DEPARTMENT ([facility])    4/21/21     ED 5    History     Chief Complaint   Patient presents with     Nausea, Vomiting, & Diarrhea     cannot keep anything down. no appetite since sunday     The history is provided by the patient and medical records.     Lane Gonzales is a 36 year old COVID-19 positive male who presents with nausea, vomiting, and diarrhea. Patient was seen 5 days ago in the ED for nausea, vomiting cough and tested positive for COVID-19 on 4/19/21. Prior to this he had gotten the [employer] COVID-19 vaccine. He was discharged to home, but continued to have nausea and vomiting at home. He was not discharged home was any antiemetics from the ED. Patient has not been able to keep anything down, hasn't had any appetite for the past 3 days. Patient states he took ibuprofen a few nights ago and it made him feel worse. No hematemesis. Endorses a little diarrhea, mild cough. No shortness of breath or chest pain. No neurologic symptoms. Patient came in to the Emergency Department because he feels dehydrated and a needs prescription for antiemetics.         Past Medical History  Past Medical History:   Diagnosis Date     Allergic state      Seizures (H)      History reviewed. No pertinent surgical history.  Cholecalciferol (VITAMIN D) 2000 UNITS tablet  levETIRAcetam (KEPPRA) 500 MG tablet  ondansetron (ZOFRAN ODT) 4 MG ODT tab  ondansetron (ZOFRAN) 4 MG tablet  tamsulosin (FLOMAX) 0.4 MG capsule  ibuprofen (ADVIL/MOTRIN) 200 MG tablet      Allergies   Allergen Reactions     Nkda [No Known Drug Allergies]      Seasonal Allergies      Family History  Family History   Problem Relation Age of Onset     Hypertension Father      Social History   Social History     Tobacco Use     Smoking status: Never Smoker     Smokeless tobacco: Never Used   Substance Use Topics     Alcohol use: No     Drug use: No      Past medical history, past surgical history, medications,  allergies, family history, and social history were reviewed with the patient. No additional pertinent items.       Review of Systems   Constitutional: Positive for activity change, appetite change (poor) and fatigue. Negative for fever.   HENT: Negative for sore throat and trouble swallowing.    Eyes: Negative for visual disturbance.   Respiratory: Positive for cough. Negative for choking, shortness of breath and wheezing (denies).    Cardiovascular: Negative for chest pain and leg swelling.   Gastrointestinal: Positive for diarrhea, nausea and vomiting. Negative for abdominal pain.   Endocrine: Negative for polyuria.   Genitourinary: Negative for dysuria.   Musculoskeletal: Negative for back pain, gait problem and neck pain.   Skin: Negative for rash.   Neurological: Positive for weakness and light-headedness.   Hematological: Does not bruise/bleed easily.   Psychiatric/Behavioral: Negative for confusion, decreased concentration and dysphoric mood. The patient is nervous/anxious.         Patient anxious feels that he has not treated appropriate with last ER visit as he did not receive a prescription for an antiemetic.   All other systems reviewed and are negative.    A complete review of systems was performed with pertinent positives and negatives noted in the HPI, and all other systems negative.       Physical Exam   BP: (!) 122/90  Pulse: 95  Temp: 100.7  F (38.2  C)  Resp: 18  SpO2: 92 %  Physical Exam  Vitals signs and nursing note reviewed.   Constitutional:       General: He is in acute distress.      Appearance: He is well-developed. He is not ill-appearing, toxic-appearing or diaphoretic.      Comments: Patient in the ER had many questions able speak full sentences denies being shortness of breath.  Complains of nausea primarily without pain.  Cough noted intermittently.   HENT:      Head: Normocephalic and atraumatic.      Nose: Nose normal.      Mouth/Throat:      Mouth: Mucous membranes are dry.   Eyes:       General: No scleral icterus.     Extraocular Movements: Extraocular movements intact.      Conjunctiva/sclera: Conjunctivae normal.      Pupils: Pupils are equal, round, and reactive to light.   Neck:      Musculoskeletal: Normal range of motion and neck supple. No neck rigidity.   Cardiovascular:      Rate and Rhythm: Normal rate and regular rhythm.   Pulmonary:      Effort: Respiratory distress present.      Breath sounds: Wheezing present.   Abdominal:      General: There is no distension.      Palpations: Abdomen is soft. There is no mass.      Tenderness: There is no abdominal tenderness. There is no guarding.   Musculoskeletal:         General: No swelling or tenderness.      Comments: Negative Homans   Skin:     General: Skin is warm and dry.      Capillary Refill: Capillary refill takes less than 2 seconds.      Coloration: Skin is not pale.      Findings: No erythema or rash.   Neurological:      General: No focal deficit present.      Mental Status: He is alert and oriented to person, place, and time. Mental status is at baseline.   Psychiatric:      Comments: Patient with some mild anxiousness primarily he has a lot of questions regarding medications laboratory testings etc.         ED Course         Patient ER was evaluated.  IV was established patient received a liter of normal saline x2.  Zofran for nausea.  Patient had chest x-ray looking for any type of effusion he has findings more consistent with interstitial changes with Covid lung findings.  EKG was done also no hyperacute changes were seen.  Laboratory testing done also.  BNP was negative troponin negative.  Lipase 198.  CRP is 20.  Sodium 138 potassium 3.8.  Bicarb 24 anion gap 7 glucose 94 creatinine 0.72.  White cell count was 4.4.  Hemoglobin 15.9.  INR 1.04.  As noted liver function test within normal limits.    Here in the ER patient was reassessed did receive albuterol inhaler as noted also.  Patient felt better here in the ER.   Monitored his oxygen saturation and when we got good waveforms his sats were 94 to 95% room air.  Patient states he feels fine.  We we will plan to discharge him home with the Covid loop follow-up along with this patient will be given an oximeter to go home and monitor his oxygen levels recommended laying on his stomach if needed for any shortness of breath etc.  Using albuterol inhaler Zofran for nausea also pushing fluids following up with MD and return if any concerns patient agrees with this plan and comfortably discharged after lengthy explanations were given to his questions.    Procedures              EKG Interpretation:      EKG Number: 1  Interpreted by Antonio Smith MD  Symptoms at time of EKG: Nausea, vomiting, diarrhea, and generalized weakness  Rhythm: normal sinus   Rate: 97  Axis: NORMAL  Ectopy: none  Conduction: normal  ST Segments/ T Waves: Nonspecific ST-T wave changes  Q Waves: none  Comparison to prior: No old EKG available    Clinical Impression: Nonspecific EKG         Results for orders placed or performed during the hospital encounter of 04/21/21   XR Chest Port 1 View     Status: None    Narrative    CHEST ONE VIEW  4/21/2021 9:42 PM     HISTORY: COVID with nausea and vomiting.    COMPARISON: April 16, 2021      Impression    IMPRESSION: Moderately extensive interstitial and groundglass  infiltrates.    MATIAS LICEA MD   CBC with platelets differential     Status: None   Result Value Ref Range    WBC 4.4 4.0 - 11.0 10e9/L    RBC Count 5.21 4.4 - 5.9 10e12/L    Hemoglobin 15.9 13.3 - 17.7 g/dL    Hematocrit 47.1 40.0 - 53.0 %    MCV 90 78 - 100 fl    MCH 30.5 26.5 - 33.0 pg    MCHC 33.8 31.5 - 36.5 g/dL    RDW 13.1 10.0 - 15.0 %    Platelet Count 228 150 - 450 10e9/L    Diff Method Automated Method     % Neutrophils 69.2 %    % Lymphocytes 22.7 %    % Monocytes 7.7 %    % Eosinophils 0.0 %    % Basophils 0.2 %    % Immature Granulocytes 0.2 %    Nucleated RBCs 0 0 /100    Absolute Neutrophil  3.1 1.6 - 8.3 10e9/L    Absolute Lymphocytes 1.0 0.8 - 5.3 10e9/L    Absolute Monocytes 0.3 0.0 - 1.3 10e9/L    Absolute Eosinophils 0.0 0.0 - 0.7 10e9/L    Absolute Basophils 0.0 0.0 - 0.2 10e9/L    Abs Immature Granulocytes 0.0 0 - 0.4 10e9/L    Absolute Nucleated RBC 0.0    CRP inflammation     Status: Abnormal   Result Value Ref Range    CRP Inflammation 20.0 (H) 0.0 - 8.0 mg/L   Partial thromboplastin time     Status: None   Result Value Ref Range    PTT 31 22 - 37 sec   INR     Status: None   Result Value Ref Range    INR 1.04 0.86 - 1.14   Comprehensive metabolic panel     Status: Abnormal   Result Value Ref Range    Sodium 138 133 - 144 mmol/L    Potassium 3.8 3.4 - 5.3 mmol/L    Chloride 107 94 - 109 mmol/L    Carbon Dioxide 24 20 - 32 mmol/L    Anion Gap 7 3 - 14 mmol/L    Glucose 94 70 - 99 mg/dL    Urea Nitrogen 9 7 - 30 mg/dL    Creatinine 0.72 0.66 - 1.25 mg/dL    GFR Estimate >90 >60 mL/min/[1.73_m2]    GFR Estimate If Black >90 >60 mL/min/[1.73_m2]    Calcium 8.2 (L) 8.5 - 10.1 mg/dL    Bilirubin Total 0.3 0.2 - 1.3 mg/dL    Albumin 3.6 3.4 - 5.0 g/dL    Protein Total 8.0 6.8 - 8.8 g/dL    Alkaline Phosphatase 89 40 - 150 U/L    ALT 50 0 - 70 U/L    AST 44 0 - 45 U/L   Lipase     Status: None   Result Value Ref Range    Lipase 198 73 - 393 U/L   Troponin I     Status: None   Result Value Ref Range    Troponin I ES <0.015 0.000 - 0.045 ug/L   Nt probnp inpatient (BNP)     Status: None   Result Value Ref Range    N-Terminal Pro BNP Inpatient 17 0 - 450 pg/mL   EKG 12-lead, tracing only     Status: None   Result Value Ref Range    Interpretation ECG Click View Image link to view waveform and result      Medications   0.9% sodium chloride BOLUS (0 mLs Intravenous Stopped 4/21/21 2200)   albuterol (PROAIR HFA/PROVENTIL HFA/VENTOLIN HFA) 108 (90 Base) MCG/ACT inhaler 2 puff (2 puffs Inhalation Given 4/21/21 2227)        Assessments & Plan (with Medical Decision Making)  36-year-old male recent diagnosis of  Covid presents with ongoing nausea vomiting.  No abdominal pain labs otherwise stable cardiac work-up otherwise negative chest x-ray shows Covid interstitial changes noted but no marked effusion.  Liver function test etc. otherwise negative lipase normal.  Patient given IV fluids along with Zofran.  Patient has some slight wheezing but oxygen saturation is 98 9495% room air.  Albuterol inhaler given patient to be discharged home with oximeter monitoring at home along with a loop follow-up recommendations for Covid along with using albuterol inhaler as needed for the cough laying on his abdomen as needed for breathing issues return if worsening symptoms Zofran for nausea encouraging fluids.  Patient agrees with plan and discharge stable nontoxic.       I have reviewed the nursing notes. I have reviewed the findings, diagnosis, plan and need for follow up with the patient.    Discharge Medication List as of 4/21/2021 11:47 PM      START taking these medications    Details   ondansetron (ZOFRAN ODT) 4 MG ODT tab Take 1 tablet (4 mg) by mouth every 8 hours as needed for nausea or vomiting, Disp-12 tablet, R-0, Local Print             Final diagnoses:   2019 novel coronavirus disease (COVID-19)   Nausea and vomiting, intractability of vomiting not specified, unspecified vomiting type   Dehydration     I, Jaylin Gutiérrez, am serving as a trained medical scribe to document services personally performed by Antonio Smith MD based on the provider's statements to me on April 21, 2021.  This document has been checked and approved by the attending provider.    I, Antonio Smith MD, was physically present and have reviewed and verified the accuracy of this note documented by Jaylin Gutiérrez, medical scribe.     --  Antonio Smith MD  [facility] EMERGENCY DEPARTMENT  4/21/2021    This note was created at least in part by the use of dragon voice dictation system. Inadvertent typographical errors may still  exist.  Antonio Smith MD.    Patient evaluated in the emergency department during the COVID-19 pandemic period. Careful attention to patients safety was addressed throughout the evaluation. Evaluation and treatment management was initiated with disposition made efficiently and appropriate as possible to minimize any risk of potential exposure to patient during this evaluation.       Antonio Smith MD  04/22/21 2022

## 2021-04-22 NOTE — ED TRIAGE NOTES
Dx covid last Thursday, also had the vaccine. Since he's been nausea, vomiting with diarrhea but diarrhea is getting better. He feel dehydrated and needs electrolytes

## 2021-04-22 NOTE — DISCHARGE INSTRUCTIONS
Home.  Your oxygen level is still good at 95% even with lung changes seen on xray from COVID.  Your other labs look good.  Use the zofran for nausea as needed.  Use the albuterol inhaler for cough and breathing.  Monitor your oxygen level at home with the oximeter.  Follow up with MD  Return if worse or concerning symptoms, oxygen level in the 80's% or concerned.  Push fluids.    Results for orders placed or performed during the hospital encounter of 04/21/21   XR Chest Port 1 View     Status: None    Narrative    CHEST ONE VIEW  4/21/2021 9:42 PM     HISTORY: COVID with nausea and vomiting.    COMPARISON: April 16, 2021      Impression    IMPRESSION: Moderately extensive interstitial and groundglass  infiltrates.    MATIAS LICEA MD   CBC with platelets differential     Status: None   Result Value Ref Range    WBC 4.4 4.0 - 11.0 10e9/L    RBC Count 5.21 4.4 - 5.9 10e12/L    Hemoglobin 15.9 13.3 - 17.7 g/dL    Hematocrit 47.1 40.0 - 53.0 %    MCV 90 78 - 100 fl    MCH 30.5 26.5 - 33.0 pg    MCHC 33.8 31.5 - 36.5 g/dL    RDW 13.1 10.0 - 15.0 %    Platelet Count 228 150 - 450 10e9/L    Diff Method Automated Method     % Neutrophils 69.2 %    % Lymphocytes 22.7 %    % Monocytes 7.7 %    % Eosinophils 0.0 %    % Basophils 0.2 %    % Immature Granulocytes 0.2 %    Nucleated RBCs 0 0 /100    Absolute Neutrophil 3.1 1.6 - 8.3 10e9/L    Absolute Lymphocytes 1.0 0.8 - 5.3 10e9/L    Absolute Monocytes 0.3 0.0 - 1.3 10e9/L    Absolute Eosinophils 0.0 0.0 - 0.7 10e9/L    Absolute Basophils 0.0 0.0 - 0.2 10e9/L    Abs Immature Granulocytes 0.0 0 - 0.4 10e9/L    Absolute Nucleated RBC 0.0    CRP inflammation     Status: Abnormal   Result Value Ref Range    CRP Inflammation 20.0 (H) 0.0 - 8.0 mg/L   Partial thromboplastin time     Status: None   Result Value Ref Range    PTT 31 22 - 37 sec   INR     Status: None   Result Value Ref Range    INR 1.04 0.86 - 1.14   Comprehensive metabolic panel     Status: Abnormal   Result Value Ref  Range    Sodium 138 133 - 144 mmol/L    Potassium 3.8 3.4 - 5.3 mmol/L    Chloride 107 94 - 109 mmol/L    Carbon Dioxide 24 20 - 32 mmol/L    Anion Gap 7 3 - 14 mmol/L    Glucose 94 70 - 99 mg/dL    Urea Nitrogen 9 7 - 30 mg/dL    Creatinine 0.72 0.66 - 1.25 mg/dL    GFR Estimate >90 >60 mL/min/[1.73_m2]    GFR Estimate If Black >90 >60 mL/min/[1.73_m2]    Calcium 8.2 (L) 8.5 - 10.1 mg/dL    Bilirubin Total 0.3 0.2 - 1.3 mg/dL    Albumin 3.6 3.4 - 5.0 g/dL    Protein Total 8.0 6.8 - 8.8 g/dL    Alkaline Phosphatase 89 40 - 150 U/L    ALT 50 0 - 70 U/L    AST 44 0 - 45 U/L   Lipase     Status: None   Result Value Ref Range    Lipase 198 73 - 393 U/L   Troponin I     Status: None   Result Value Ref Range    Troponin I ES <0.015 0.000 - 0.045 ug/L   Nt probnp inpatient (BNP)     Status: None   Result Value Ref Range    N-Terminal Pro BNP Inpatient 17 0 - 450 pg/mL   EKG 12-lead, tracing only     Status: None   Result Value Ref Range    Interpretation ECG Click View Image link to view waveform and result

## 2021-04-22 NOTE — ED NOTES
Bed: ED05  Expected date:   Expected time:   Means of arrival:   Comments:  Hold [facility] [address] 37 y/o Nausea and vomiting Covid +